# Patient Record
Sex: MALE | Race: WHITE | NOT HISPANIC OR LATINO | Employment: FULL TIME | URBAN - METROPOLITAN AREA
[De-identification: names, ages, dates, MRNs, and addresses within clinical notes are randomized per-mention and may not be internally consistent; named-entity substitution may affect disease eponyms.]

---

## 2017-10-23 ENCOUNTER — OFFICE VISIT (OUTPATIENT)
Dept: MEDICAL GROUP | Facility: MEDICAL CENTER | Age: 32
End: 2017-10-23
Payer: COMMERCIAL

## 2017-10-23 VITALS
RESPIRATION RATE: 16 BRPM | BODY MASS INDEX: 25.98 KG/M2 | WEIGHT: 191.8 LBS | TEMPERATURE: 98.1 F | DIASTOLIC BLOOD PRESSURE: 78 MMHG | HEART RATE: 86 BPM | OXYGEN SATURATION: 97 % | SYSTOLIC BLOOD PRESSURE: 118 MMHG | HEIGHT: 72 IN

## 2017-10-23 DIAGNOSIS — Z00.00 ROUTINE GENERAL MEDICAL EXAMINATION AT A HEALTH CARE FACILITY: ICD-10-CM

## 2017-10-23 DIAGNOSIS — F41.9 ANXIETY: ICD-10-CM

## 2017-10-23 PROCEDURE — 99203 OFFICE O/P NEW LOW 30 MIN: CPT | Performed by: NURSE PRACTITIONER

## 2017-10-23 RX ORDER — BUSPIRONE HYDROCHLORIDE 7.5 MG/1
7.5 TABLET ORAL 2 TIMES DAILY
Qty: 60 TAB | Refills: 5 | Status: SHIPPED | OUTPATIENT
Start: 2017-10-23 | End: 2018-01-17

## 2017-10-23 ASSESSMENT — ENCOUNTER SYMPTOMS: NERVOUS/ANXIOUS: 1

## 2017-10-23 ASSESSMENT — PATIENT HEALTH QUESTIONNAIRE - PHQ9: CLINICAL INTERPRETATION OF PHQ2 SCORE: 0
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
## 2017-11-06 ENCOUNTER — HOSPITAL ENCOUNTER (OUTPATIENT)
Dept: LAB | Facility: MEDICAL CENTER | Age: 32
End: 2017-11-06
Attending: NURSE PRACTITIONER
Payer: COMMERCIAL

## 2017-11-06 DIAGNOSIS — Z00.00 ROUTINE GENERAL MEDICAL EXAMINATION AT A HEALTH CARE FACILITY: ICD-10-CM

## 2017-11-06 LAB
ALBUMIN SERPL BCP-MCNC: 5 G/DL (ref 3.2–4.9)
ALBUMIN/GLOB SERPL: 1.6 G/DL
ALP SERPL-CCNC: 73 U/L (ref 30–99)
ALT SERPL-CCNC: 16 U/L (ref 2–50)
ANION GAP SERPL CALC-SCNC: 6 MMOL/L (ref 0–11.9)
AST SERPL-CCNC: 15 U/L (ref 12–45)
BILIRUB SERPL-MCNC: 0.9 MG/DL (ref 0.1–1.5)
BUN SERPL-MCNC: 11 MG/DL (ref 8–22)
CALCIUM SERPL-MCNC: 10.5 MG/DL (ref 8.5–10.5)
CHLORIDE SERPL-SCNC: 105 MMOL/L (ref 96–112)
CHOLEST SERPL-MCNC: 149 MG/DL (ref 100–199)
CO2 SERPL-SCNC: 29 MMOL/L (ref 20–33)
CREAT SERPL-MCNC: 0.83 MG/DL (ref 0.5–1.4)
GFR SERPL CREATININE-BSD FRML MDRD: >60 ML/MIN/1.73 M 2
GLOBULIN SER CALC-MCNC: 3.1 G/DL (ref 1.9–3.5)
GLUCOSE SERPL-MCNC: 87 MG/DL (ref 65–99)
HDLC SERPL-MCNC: 44 MG/DL
LDLC SERPL CALC-MCNC: 95 MG/DL
POTASSIUM SERPL-SCNC: 4.4 MMOL/L (ref 3.6–5.5)
PROT SERPL-MCNC: 8.1 G/DL (ref 6–8.2)
SODIUM SERPL-SCNC: 140 MMOL/L (ref 135–145)
TRIGL SERPL-MCNC: 49 MG/DL (ref 0–149)
TSH SERPL DL<=0.005 MIU/L-ACNC: 1.49 UIU/ML (ref 0.3–3.7)

## 2017-11-06 PROCEDURE — 84443 ASSAY THYROID STIM HORMONE: CPT

## 2017-11-06 PROCEDURE — 80053 COMPREHEN METABOLIC PANEL: CPT

## 2017-11-06 PROCEDURE — 80061 LIPID PANEL: CPT

## 2017-11-06 PROCEDURE — 36415 COLL VENOUS BLD VENIPUNCTURE: CPT

## 2018-01-17 ENCOUNTER — OFFICE VISIT (OUTPATIENT)
Dept: MEDICAL GROUP | Facility: MEDICAL CENTER | Age: 33
End: 2018-01-17
Payer: COMMERCIAL

## 2018-01-17 VITALS
RESPIRATION RATE: 16 BRPM | HEIGHT: 72 IN | DIASTOLIC BLOOD PRESSURE: 74 MMHG | HEART RATE: 85 BPM | TEMPERATURE: 97.2 F | SYSTOLIC BLOOD PRESSURE: 132 MMHG | BODY MASS INDEX: 26.28 KG/M2 | OXYGEN SATURATION: 98 % | WEIGHT: 194 LBS

## 2018-01-17 DIAGNOSIS — F41.1 GAD (GENERALIZED ANXIETY DISORDER): ICD-10-CM

## 2018-01-17 PROCEDURE — 99213 OFFICE O/P EST LOW 20 MIN: CPT | Performed by: NURSE PRACTITIONER

## 2018-01-17 RX ORDER — BUSPIRONE HYDROCHLORIDE 10 MG/1
10 TABLET ORAL 2 TIMES DAILY
Qty: 60 TAB | Refills: 11 | Status: SHIPPED | OUTPATIENT
Start: 2018-01-17 | End: 2018-05-21

## 2018-01-17 ASSESSMENT — ENCOUNTER SYMPTOMS: NERVOUS/ANXIOUS: 1

## 2018-01-18 NOTE — PROGRESS NOTES
Subjective:      Tereso Rosen is a 32 y.o. male who presents with Medication Management (medication check) and Results (labs)        CC: Patient is here today to discuss treatment for his anxiety.    HPI Tereso Rosen has history of anxiety for which she was previously on Zoloft for about 2 years. He felt it was no longer working well for him and on his last visit requested switching to BuSpar which he has been taking at 7.5 mg twice a day. He does not feel this has been working adequately as well. He did go to counseling for a few sessions but did not feel it helped him. He is not thinking that he has depression but does wonder about PTSD. He states his counselor did not give him any further diagnoses. He is wondering if there is something better for his anxiety but does not want to go back on an SSRI. He denies drug usage. He denies being suicidal.        Social History   Substance Use Topics   • Smoking status: Current Some Day Smoker     Packs/day: 1.00     Types: Cigarettes   • Smokeless tobacco: Never Used      Comment: 3 months ago    • Alcohol use 0.0 oz/week      Comment: occass     Current Outpatient Prescriptions   Medication Sig Dispense Refill   • busPIRone (BUSPAR) 10 MG Tab Take 1 Tab by mouth 2 times a day. 60 Tab 11   • busPIRone (BUSPAR) 7.5 MG tablet Take 1 Tab by mouth 2 times a day. 60 Tab 5     No current facility-administered medications for this visit.      Past Medical History:   Diagnosis Date   • Bronchitis      Family History   Problem Relation Age of Onset   • Diabetes Father    • No Known Problems Mother    • Diabetes Sister        Review of Systems   Psychiatric/Behavioral: The patient is nervous/anxious.    All other systems reviewed and are negative.         Objective:     /74   Pulse 85   Temp 36.2 °C (97.2 °F)   Resp 16   Ht 1.829 m (6')   Wt 88 kg (194 lb)   SpO2 98%   BMI 26.31 kg/m²      Physical Exam   Constitutional: He is oriented to person, place, and  time. He appears well-developed and well-nourished. No distress.   HENT:   Head: Normocephalic and atraumatic.   Right Ear: External ear normal.   Left Ear: External ear normal.   Nose: Nose normal.   Mouth/Throat: Oropharynx is clear and moist.   Eyes: Conjunctivae are normal. Right eye exhibits no discharge. Left eye exhibits no discharge.   Neck: Normal range of motion. Neck supple. No tracheal deviation present. No thyromegaly present.   Cardiovascular: Normal rate, regular rhythm and normal heart sounds.    No murmur heard.  Pulmonary/Chest: Effort normal and breath sounds normal. No respiratory distress. He has no wheezes. He has no rales.   Lymphadenopathy:     He has no cervical adenopathy.   Neurological: He is alert and oriented to person, place, and time. Coordination normal.   Skin: Skin is warm and dry. No rash noted. He is not diaphoretic. No erythema.   Psychiatric: He has a normal mood and affect. His behavior is normal. Judgment and thought content normal.   Nursing note and vitals reviewed.              Assessment/Plan:     1. SHIRA (generalized anxiety disorder)  Patient does not want to add or switch to an SSRI. I will increase his BuSpar to 10 mg twice a day and advised him that I can increase to 15 mg twice a day if necessary. However, my suggestion is for him to see a psychiatrist and talk about other medication options since nothing seems to work especially well for his symptoms. Patient agrees to this.  - busPIRone (BUSPAR) 10 MG Tab; Take 1 Tab by mouth 2 times a day.  Dispense: 60 Tab; Refill: 11  - REFERRAL TO PSYCHIATRY

## 2018-03-19 ENCOUNTER — TELEPHONE (OUTPATIENT)
Dept: MEDICAL GROUP | Facility: MEDICAL CENTER | Age: 33
End: 2018-03-19

## 2018-03-19 NOTE — TELEPHONE ENCOUNTER
1. Caller Name: Tereso Rosen                      Call Back Number:[phone] (home)         2. Message: Pt called wondering if srinivasan had placed his referral to behavioral health, I told him that it was sent to behavioral health as expected and he can call [phone] to schedule an appt.    3. Patient approves office to leave a detailed voicemail/MyChart message: N\A

## 2018-05-21 ENCOUNTER — OFFICE VISIT (OUTPATIENT)
Dept: BEHAVIORAL HEALTH | Facility: PHYSICIAN GROUP | Age: 33
End: 2018-05-21
Payer: COMMERCIAL

## 2018-05-21 DIAGNOSIS — F40.00 AGORAPHOBIA: ICD-10-CM

## 2018-05-21 DIAGNOSIS — F41.1 GAD (GENERALIZED ANXIETY DISORDER): ICD-10-CM

## 2018-05-21 PROCEDURE — 99204 OFFICE O/P NEW MOD 45 MIN: CPT | Performed by: STUDENT IN AN ORGANIZED HEALTH CARE EDUCATION/TRAINING PROGRAM
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
## 2018-06-29 ENCOUNTER — TELEPHONE (OUTPATIENT)
Dept: BEHAVIORAL HEALTH | Facility: PHYSICIAN GROUP | Age: 33
End: 2018-06-29

## 2018-07-03 ENCOUNTER — OFFICE VISIT (OUTPATIENT)
Dept: BEHAVIORAL HEALTH | Facility: PHYSICIAN GROUP | Age: 33
End: 2018-07-03
Payer: COMMERCIAL

## 2018-07-03 VITALS
SYSTOLIC BLOOD PRESSURE: 134 MMHG | DIASTOLIC BLOOD PRESSURE: 92 MMHG | BODY MASS INDEX: 24.64 KG/M2 | HEIGHT: 72 IN | WEIGHT: 181.9 LBS | HEART RATE: 74 BPM

## 2018-07-03 DIAGNOSIS — F40.00 AGORAPHOBIA: ICD-10-CM

## 2018-07-03 PROCEDURE — 99213 OFFICE O/P EST LOW 20 MIN: CPT | Performed by: PSYCHIATRY & NEUROLOGY

## 2018-07-03 RX ORDER — SERTRALINE HYDROCHLORIDE 100 MG/1
100 TABLET, FILM COATED ORAL DAILY
Qty: 30 TAB | Refills: 3 | Status: SHIPPED | OUTPATIENT
Start: 2018-07-03 | End: 2018-11-06 | Stop reason: SDUPTHER
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
## 2018-11-07 RX ORDER — SERTRALINE HYDROCHLORIDE 100 MG/1
100 TABLET, FILM COATED ORAL DAILY
Qty: 30 TAB | Refills: 3 | Status: SHIPPED | OUTPATIENT
Start: 2018-11-07 | End: 2019-02-08 | Stop reason: SDUPTHER

## 2019-02-08 ENCOUNTER — OFFICE VISIT (OUTPATIENT)
Dept: BEHAVIORAL HEALTH | Facility: CLINIC | Age: 34
End: 2019-02-08
Payer: COMMERCIAL

## 2019-02-08 VITALS
WEIGHT: 193 LBS | DIASTOLIC BLOOD PRESSURE: 58 MMHG | BODY MASS INDEX: 26.14 KG/M2 | SYSTOLIC BLOOD PRESSURE: 124 MMHG | HEIGHT: 72 IN | HEART RATE: 72 BPM

## 2019-02-08 DIAGNOSIS — F40.00 AGORAPHOBIA: ICD-10-CM

## 2019-02-08 PROCEDURE — 99213 OFFICE O/P EST LOW 20 MIN: CPT | Mod: GC | Performed by: PSYCHIATRY & NEUROLOGY

## 2019-02-08 RX ORDER — HYDROXYZINE 50 MG/1
TABLET, FILM COATED ORAL
Qty: 90 TAB | Refills: 1 | Status: SHIPPED | OUTPATIENT
Start: 2019-02-08 | End: 2019-02-08 | Stop reason: SDUPTHER

## 2019-02-08 RX ORDER — SERTRALINE HYDROCHLORIDE 100 MG/1
100 TABLET, FILM COATED ORAL DAILY
Qty: 90 TAB | Refills: 1 | Status: SHIPPED | OUTPATIENT
Start: 2019-02-08 | End: 2019-07-03 | Stop reason: SDUPTHER

## 2019-02-08 RX ORDER — HYDROXYZINE 50 MG/1
TABLET, FILM COATED ORAL
Qty: 90 TAB | Refills: 1 | Status: SHIPPED | OUTPATIENT
Start: 2019-02-08 | End: 2020-01-06

## 2019-02-08 ASSESSMENT — ENCOUNTER SYMPTOMS
WEIGHT LOSS: 0
CHILLS: 0
FEVER: 0
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
## 2019-05-10 ENCOUNTER — APPOINTMENT (OUTPATIENT)
Dept: BEHAVIORAL HEALTH | Facility: CLINIC | Age: 34
End: 2019-05-10
Payer: COMMERCIAL
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
## 2019-07-03 ENCOUNTER — OFFICE VISIT (OUTPATIENT)
Dept: BEHAVIORAL HEALTH | Facility: CLINIC | Age: 34
End: 2019-07-03
Payer: COMMERCIAL

## 2019-07-03 VITALS
RESPIRATION RATE: 14 BRPM | DIASTOLIC BLOOD PRESSURE: 94 MMHG | SYSTOLIC BLOOD PRESSURE: 137 MMHG | HEIGHT: 72 IN | BODY MASS INDEX: 27.09 KG/M2 | HEART RATE: 73 BPM | WEIGHT: 200 LBS

## 2019-07-03 DIAGNOSIS — F43.10 PTSD (POST-TRAUMATIC STRESS DISORDER): ICD-10-CM

## 2019-07-03 DIAGNOSIS — F40.00 AGORAPHOBIA: ICD-10-CM

## 2019-07-03 PROCEDURE — 99214 OFFICE O/P EST MOD 30 MIN: CPT | Performed by: NURSE PRACTITIONER

## 2019-07-03 RX ORDER — SERTRALINE HYDROCHLORIDE 100 MG/1
100 TABLET, FILM COATED ORAL DAILY
Qty: 90 TAB | Refills: 0 | Status: SHIPPED | OUTPATIENT
Start: 2019-07-03 | End: 2019-11-02 | Stop reason: SDUPTHER

## 2019-07-03 ASSESSMENT — PATIENT HEALTH QUESTIONNAIRE - PHQ9: CLINICAL INTERPRETATION OF PHQ2 SCORE: 0

## 2019-08-16 ENCOUNTER — OFFICE VISIT (OUTPATIENT)
Dept: BEHAVIORAL HEALTH | Facility: CLINIC | Age: 34
End: 2019-08-16
Payer: COMMERCIAL

## 2019-08-16 DIAGNOSIS — F43.10 PTSD (POST-TRAUMATIC STRESS DISORDER): ICD-10-CM

## 2019-08-16 DIAGNOSIS — F40.00 AGORAPHOBIA: ICD-10-CM

## 2019-08-16 PROCEDURE — 90791 PSYCH DIAGNOSTIC EVALUATION: CPT | Performed by: SOCIAL WORKER
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
## 2019-09-30 ENCOUNTER — APPOINTMENT (OUTPATIENT)
Dept: BEHAVIORAL HEALTH | Facility: CLINIC | Age: 34
End: 2019-09-30
Payer: COMMERCIAL
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
## 2019-10-02 ENCOUNTER — OFFICE VISIT (OUTPATIENT)
Dept: BEHAVIORAL HEALTH | Facility: CLINIC | Age: 34
End: 2019-10-02
Payer: COMMERCIAL

## 2019-10-02 VITALS
BODY MASS INDEX: 27.9 KG/M2 | RESPIRATION RATE: 16 BRPM | HEART RATE: 84 BPM | DIASTOLIC BLOOD PRESSURE: 78 MMHG | SYSTOLIC BLOOD PRESSURE: 133 MMHG | HEIGHT: 72 IN | WEIGHT: 206 LBS

## 2019-10-02 DIAGNOSIS — F43.10 PTSD (POST-TRAUMATIC STRESS DISORDER): ICD-10-CM

## 2019-10-02 PROCEDURE — 99214 OFFICE O/P EST MOD 30 MIN: CPT | Performed by: NURSE PRACTITIONER

## 2019-10-02 PROCEDURE — 90833 PSYTX W PT W E/M 30 MIN: CPT | Performed by: NURSE PRACTITIONER

## 2019-10-04 ENCOUNTER — OFFICE VISIT (OUTPATIENT)
Dept: BEHAVIORAL HEALTH | Facility: CLINIC | Age: 34
End: 2019-10-04
Payer: COMMERCIAL

## 2019-10-04 DIAGNOSIS — F43.10 PTSD (POST-TRAUMATIC STRESS DISORDER): ICD-10-CM

## 2019-10-04 DIAGNOSIS — F40.00 AGORAPHOBIA: ICD-10-CM

## 2019-10-04 PROCEDURE — 90834 PSYTX W PT 45 MINUTES: CPT | Performed by: SOCIAL WORKER
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
## 2019-11-08 ENCOUNTER — OFFICE VISIT (OUTPATIENT)
Dept: BEHAVIORAL HEALTH | Facility: CLINIC | Age: 34
End: 2019-11-08
Payer: COMMERCIAL

## 2019-11-08 DIAGNOSIS — F43.10 PTSD (POST-TRAUMATIC STRESS DISORDER): ICD-10-CM

## 2019-11-08 DIAGNOSIS — F41.1 GAD (GENERALIZED ANXIETY DISORDER): ICD-10-CM

## 2019-11-08 PROCEDURE — 90834 PSYTX W PT 45 MINUTES: CPT | Performed by: SOCIAL WORKER

## 2019-12-04 NOTE — BH THERAPY
[facility]  Therapy Progress Note    Patient Name: Tereso Rosen  Patient MRN: [id number]  Today's Date: 12/3/2019     Type of session:Individual psychotherapy  Length of session: 45 minutes  Persons in attendance:Patient    Subjective/New Info: Reviewed CBT cognitive restructuring technique to use prior to driving and he was given thought record to process the experience post driving experience.  We will review worksheets next session.  Discussed range of emotions related to life experience to include difficult and or uncomfortable feelings.  Reviewed coping strategies including distress tolerance techniques mindfulness, meditation, and breathing.    Discussed home environment with Jabari reporting step son and his girlfriend are planning to get own apartment which patient is encouraging.     Patient did not present in acute distress. Patient was appropriately groomed and cooperative. Patient was alert and oriented to person, place, and time. Eye contact was appropriate. No abnormalities in attention or concentration were noted. No abnormalities of movement present; psychomotor activity was normal. Speech was fluent and regular in rhythm, rate, volume, and tone. Thought processes were linear, logical, and goal-directed. Affect was appropriate and congruent with thought content and conversation.     Objective/Observations:   Participation: Active verbal participation, Attentive, Engaged and Open to feedback   Grooming: Casual and Neat   Cognition: Alert and Fully Oriented   Eye contact: Good   Mood: Neutral   Affect: Flexible and Constricted   Thought process: Logical and Goal-directed   Speech: Rate within normal limits and Volume within normal limits   Other:     Diagnoses:   1. PTSD (post-traumatic stress disorder)    2. SHIRA (generalized anxiety disorder)         Current risk:   SUICIDE: Not applicable   Homicide: Not applicable   Self-harm: Not applicable   Relapse: Low   Other:    Safety Plan  reviewed? Not Indicated   If evidence of imminent risk is present, intervention/plan:     Therapeutic Intervention(s): Cognitive modification, Distress tolerance skills, Parenting/familial roles addressed and Self-care skills    Treatment Goal(s)/Objective(s) addressed: Learn and implement coping skills that result in a reduction of anxiety and worry, and improve daily functioning.     Progress toward Treatment Goals: Moderate improvement    Plan:  - Next appointment scheduled:  12/27/2019  - Patient is in agreement with the above plan:  YES    Corinne G. Taylor, L.C.[place]  12/3/2019
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
## 2020-01-06 ENCOUNTER — OFFICE VISIT (OUTPATIENT)
Dept: BEHAVIORAL HEALTH | Facility: CLINIC | Age: 35
End: 2020-01-06
Payer: COMMERCIAL

## 2020-01-06 VITALS
HEIGHT: 72 IN | WEIGHT: 211 LBS | DIASTOLIC BLOOD PRESSURE: 83 MMHG | SYSTOLIC BLOOD PRESSURE: 131 MMHG | BODY MASS INDEX: 28.58 KG/M2 | RESPIRATION RATE: 14 BRPM | HEART RATE: 79 BPM

## 2020-01-06 DIAGNOSIS — F43.10 PTSD (POST-TRAUMATIC STRESS DISORDER): ICD-10-CM

## 2020-01-06 PROCEDURE — 99213 OFFICE O/P EST LOW 20 MIN: CPT | Performed by: NURSE PRACTITIONER

## 2020-01-06 RX ORDER — SERTRALINE HYDROCHLORIDE 100 MG/1
150 TABLET, FILM COATED ORAL DAILY
Qty: 135 TAB | Refills: 0 | Status: SHIPPED | OUTPATIENT
Start: 2020-01-06 | End: 2020-04-15
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
## 2020-03-09 ENCOUNTER — APPOINTMENT (OUTPATIENT)
Dept: BEHAVIORAL HEALTH | Facility: CLINIC | Age: 35
End: 2020-03-09
Payer: COMMERCIAL

## 2020-04-15 RX ORDER — SERTRALINE HYDROCHLORIDE 100 MG/1
TABLET, FILM COATED ORAL
Qty: 135 TAB | Refills: 0 | Status: SHIPPED | OUTPATIENT
Start: 2020-04-15 | End: 2020-04-30

## 2020-04-30 ENCOUNTER — TELEMEDICINE (OUTPATIENT)
Dept: BEHAVIORAL HEALTH | Facility: CLINIC | Age: 35
End: 2020-04-30
Payer: COMMERCIAL

## 2020-04-30 VITALS — HEIGHT: 72 IN | BODY MASS INDEX: 27.09 KG/M2 | WEIGHT: 200 LBS

## 2020-04-30 DIAGNOSIS — F43.10 POSTTRAUMATIC STRESS DISORDER: Primary | ICD-10-CM

## 2020-04-30 DIAGNOSIS — F40.00 AGORAPHOBIA: ICD-10-CM

## 2020-04-30 PROCEDURE — 99214 OFFICE O/P EST MOD 30 MIN: CPT | Mod: 95,CR | Performed by: PSYCHIATRY & NEUROLOGY

## 2020-04-30 RX ORDER — SERTRALINE HYDROCHLORIDE 100 MG/1
200 TABLET, FILM COATED ORAL DAILY
Qty: 60 TAB | Refills: 2 | Status: SHIPPED | OUTPATIENT
Start: 2020-04-30 | End: 2020-06-03

## 2020-04-30 ASSESSMENT — PATIENT HEALTH QUESTIONNAIRE - PHQ9: CLINICAL INTERPRETATION OF PHQ2 SCORE: 0
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
## 2020-06-03 DIAGNOSIS — F43.10 POSTTRAUMATIC STRESS DISORDER: ICD-10-CM

## 2020-06-03 RX ORDER — SERTRALINE HYDROCHLORIDE 100 MG/1
TABLET, FILM COATED ORAL
Qty: 60 TAB | Refills: 2 | Status: SHIPPED | OUTPATIENT
Start: 2020-06-03 | End: 2020-08-06 | Stop reason: SDUPTHER

## 2020-08-06 ENCOUNTER — TELEMEDICINE (OUTPATIENT)
Dept: BEHAVIORAL HEALTH | Facility: CLINIC | Age: 35
End: 2020-08-06
Payer: COMMERCIAL

## 2020-08-06 VITALS — HEIGHT: 72 IN | WEIGHT: 200 LBS | BODY MASS INDEX: 27.09 KG/M2

## 2020-08-06 DIAGNOSIS — F43.10 POSTTRAUMATIC STRESS DISORDER: ICD-10-CM

## 2020-08-06 DIAGNOSIS — F40.00 AGORAPHOBIA: ICD-10-CM

## 2020-08-06 PROCEDURE — 99213 OFFICE O/P EST LOW 20 MIN: CPT | Mod: 95,CR | Performed by: PSYCHIATRY & NEUROLOGY

## 2020-08-06 PROCEDURE — 90833 PSYTX W PT W E/M 30 MIN: CPT | Mod: 95,CR | Performed by: PSYCHIATRY & NEUROLOGY

## 2020-08-06 RX ORDER — PROPRANOLOL HYDROCHLORIDE 10 MG/1
10 TABLET ORAL 2 TIMES DAILY PRN
Qty: 60 TAB | Refills: 2 | Status: SHIPPED | OUTPATIENT
Start: 2020-08-06 | End: 2020-09-02

## 2020-08-06 RX ORDER — SERTRALINE HYDROCHLORIDE 100 MG/1
TABLET, FILM COATED ORAL
Qty: 60 TAB | Refills: 2 | Status: SHIPPED | OUTPATIENT
Start: 2020-08-06 | End: 2020-11-05 | Stop reason: SDUPTHER
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
## 2020-09-02 DIAGNOSIS — F43.10 POSTTRAUMATIC STRESS DISORDER: ICD-10-CM

## 2020-09-02 DIAGNOSIS — F40.00 AGORAPHOBIA: ICD-10-CM

## 2020-09-02 RX ORDER — PROPRANOLOL HYDROCHLORIDE 10 MG/1
10 TABLET ORAL 2 TIMES DAILY PRN
Qty: 60 TAB | Refills: 2 | Status: SHIPPED | OUTPATIENT
Start: 2020-09-02 | End: 2020-11-05 | Stop reason: SDUPTHER

## 2020-11-05 ENCOUNTER — TELEMEDICINE (OUTPATIENT)
Dept: BEHAVIORAL HEALTH | Facility: CLINIC | Age: 35
End: 2020-11-05
Payer: COMMERCIAL

## 2020-11-05 VITALS — HEIGHT: 72 IN | WEIGHT: 195 LBS | BODY MASS INDEX: 26.41 KG/M2

## 2020-11-05 DIAGNOSIS — F40.00 AGORAPHOBIA: ICD-10-CM

## 2020-11-05 DIAGNOSIS — F43.10 POSTTRAUMATIC STRESS DISORDER: ICD-10-CM

## 2020-11-05 PROCEDURE — 99213 OFFICE O/P EST LOW 20 MIN: CPT | Mod: 95,CR | Performed by: PSYCHIATRY & NEUROLOGY

## 2020-11-05 RX ORDER — PROPRANOLOL HYDROCHLORIDE 10 MG/1
10 TABLET ORAL 2 TIMES DAILY PRN
Qty: 30 TAB | Refills: 0 | Status: SHIPPED | OUTPATIENT
Start: 2020-11-05 | End: 2020-12-07

## 2020-11-05 RX ORDER — SERTRALINE HYDROCHLORIDE 100 MG/1
TABLET, FILM COATED ORAL
Qty: 60 TAB | Refills: 2 | Status: SHIPPED | OUTPATIENT
Start: 2020-11-05 | End: 2021-03-02 | Stop reason: SDUPTHER
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
## 2021-03-02 ENCOUNTER — TELEMEDICINE (OUTPATIENT)
Dept: BEHAVIORAL HEALTH | Facility: CLINIC | Age: 36
End: 2021-03-02
Payer: COMMERCIAL

## 2021-03-02 DIAGNOSIS — F43.10 POSTTRAUMATIC STRESS DISORDER: ICD-10-CM

## 2021-03-02 DIAGNOSIS — F40.00 AGORAPHOBIA: ICD-10-CM

## 2021-03-02 PROCEDURE — 96127 BRIEF EMOTIONAL/BEHAV ASSMT: CPT | Mod: 95,CR | Performed by: PSYCHIATRY & NEUROLOGY

## 2021-03-02 PROCEDURE — 99213 OFFICE O/P EST LOW 20 MIN: CPT | Mod: 95,CR | Performed by: PSYCHIATRY & NEUROLOGY

## 2021-03-02 RX ORDER — PROPRANOLOL HYDROCHLORIDE 10 MG/1
10 TABLET ORAL 2 TIMES DAILY PRN
Qty: 30 TABLET | Refills: 5 | Status: ON HOLD | OUTPATIENT
Start: 2021-03-02 | End: 2021-10-15

## 2021-03-02 RX ORDER — SERTRALINE HYDROCHLORIDE 100 MG/1
TABLET, FILM COATED ORAL
Qty: 180 TABLET | Refills: 1 | Status: SHIPPED | OUTPATIENT
Start: 2021-03-02 | End: 2021-03-26

## 2021-03-02 ASSESSMENT — PATIENT HEALTH QUESTIONNAIRE - PHQ9: CLINICAL INTERPRETATION OF PHQ2 SCORE: 0

## 2021-03-02 ASSESSMENT — ANXIETY QUESTIONNAIRES
GAD7 TOTAL SCORE: 3
5. BEING SO RESTLESS THAT IT IS HARD TO SIT STILL: SEVERAL DAYS
6. BECOMING EASILY ANNOYED OR IRRITABLE: NOT AT ALL
1. FEELING NERVOUS, ANXIOUS, OR ON EDGE: SEVERAL DAYS
3. WORRYING TOO MUCH ABOUT DIFFERENT THINGS: SEVERAL DAYS
7. FEELING AFRAID AS IF SOMETHING AWFUL MIGHT HAPPEN: NOT AT ALL
2. NOT BEING ABLE TO STOP OR CONTROL WORRYING: NOT AT ALL
4. TROUBLE RELAXING: NOT AT ALL

## 2021-03-02 NOTE — PROGRESS NOTES
This evaluation was conducted via Zoom using secure and encrypted videoconferencing technology. The patient was in a private location in the state [facility].    The patient's identity was confirmed and verbal consent was obtained for this virtual visit.     PSYCHIATRY FOLLOW-UP NOTE      Name: Tereso Rosen  MRN: [id number]  : 1985  Age: 35 y.o.  Date of assessment: 3/2/2021  PCP: FERCHO Paul  Persons in attendance: Patient  Total face-to-face time: 15 minutes  Time for documentation and reviewing past records:  10 minutes    REASON FOR VISIT/CHIEF COMPLAINT (as stated by Patient):  Tereso Rosen is a 35 y.o., White male, attending follow-up appointment for mood and anxiety management.      HISTORY OF PRESENT ILLNESS:  Tereso Rosen is a 35 y.o. old male with PTSD and SHIRA comes in today for follow up. Patient was last seen 3 months ago, and following treatment planning recommendations were done:  · Increase Sertraline to 200 mg daily for PTSD management. given 1 month with 2 refill supply.   · Add Inderal 10 mg BID PRN for anxiety related to driving: agreed with testing first dose of signs of hypotension & initially driving with a friend. Given 30 tabs with 0 refills.  · Patient is not interested in psychotherapy at this time.      Patient is compliant with medication with no side effect.  Patient describes current dose of Zoloft as helpful with anxiety symptom and is denying any symptoms of depression.  Patient scored 0 on PHQ 2 and scored 3 on SHIRA 7 indicating appropriate level of anxiety.  Patient reports using Inderal only as needed and not using this on a daily basis but reports Inderal as helpful in reducing anxiety symptoms and not feeling dizzy or having any signs of low blood pressure.  Patient prefers to stay on the current medication and do not feel like he needs psychotherapy.  Patient describes everyone doing well at home and describes his work as positive at this  time.      CURRENT MEDICATIONS:  Current Outpatient Medications   Medication Sig Dispense Refill   • propranolol (INDERAL) 10 MG Tab Take 1 Tab by mouth 2 times a day as needed (anxiety). 30 Tab 0   • sertraline (ZOLOFT) 100 MG Tab TAKE 2 TABLETS BY MOUTH EVERY DAY 60 Tab 2     No current facility-administered medications for this visit.       MEDICAL HISTORY  Past Medical History:   Diagnosis Date   • Anxiety    • Bronchitis      No past surgical history on file.    PAST PSYCHIATRIC HISTORY  Prior psychiatric hospitalization: none  Prior Self harm/suicide attempt: no  Prior Diagnosis: PTSD     PAST PSYCHIATRIC MEDICATIONS  Sertraline- did not work at lower dose, started working at 100 mg daily  Buspirone 10mg PO BID - not helpful  Hydroxyzine 25-50mg TID PRN - has not needed to take it in a few months     FAMILY HISTORY  Psychiatric diagnosis:  none  History of suicide attempts:  no  Substance abuse history:  no     SUBSTANCE USE HISTORY:  ALCOHOL: no alcohol for > 2 1/2 years  TOBACCO: no  CANNABIS: no  OPIOIDS: no  PRESCRIPTION MEDICATIONS: no  OTHERS: no  History of inpatient/outpatient rehab treatment: none     SOCIAL HISTORY  Childhood: born in [place] and describes childhood as ok  Education: 2 yr college  in Special Education: no  Intellectual Disability: no  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    REVIEW OF SYSTEMS:        Constitutional  negative   Eyes negative   Ears/Nose/Mouth/Throat negative   Cardiovascular negative   Respiratory negative   Gastrointestinal negative   Genitourinary negative   Muscular negative   Integumentary negative   Neurological negative   Endocrine negative   Hematologic/Lymphatic negative     PHYSICAL EXAMINAION:  Vital signs: There were no vitals taken for this visit.  Musculoskeletal: Normal gait.   Abnormal movements: none      MENTAL STATUS EXAMINATION      General:   - Grooming and hygiene: Casual,   - Apparent distress: none,   - Behavior: Calm  - Eye Contact:  Good,   - no psychomotor agitation or retardation    - Participation: Active verbal participation  Orientation: Alert and Fully Oriented to person, place and time  Mood: Euthymic  Affect: Flexible and Full range,  Thought Process: Logical and Goal-directed  Thought Content: Denies suicidal or homicidal ideations, intent or plan Within normal limits  Perception: Denies auditory or visual hallucinations. No delusions noted Within normal limits  Attention span and concentration: Intact   Speech:Rate within normal limits and Volume within normal limits  Language: Appropriate   Insight: Good  Judgment: Good  Recent and remote memory: No gross evidence of memory deficits        DEPRESSION SCREENING:  Depression Screen (PHQ-2/PHQ-9) 10/23/2017 7/3/2019 4/30/2020   PHQ-2 Total Score 0 0 0       Interpretation of PHQ-9 Total Score   Score Severity   1-4 No Depression   5-9 Mild Depression   10-14 Moderate Depression   15-19 Moderately Severe Depression   20-27 Severe Depression    CURRENT RISK:       Suicidal: Low       Homicidal: Low       Self-Harm: Low       Relapse: Low       Crisis Safety Plan Reviewed Not Indicated       If evidence of imminent risk is present, intervention/plan:      MEDICAL RECORDS/LABS/DIAGNOSTIC TESTS REVIEWED:  No new lab since last visit     [place]  records -   Reviewed     DIAGNOSTIC  IMPRESSION(S):  1. PTSD  2. Agoraphobia     PLAN:  (1) PTSD  · Improving  · Increase Sertraline to 200 mg daily for PTSD management.  given 3 month with 1 refill supply.   · Patient is not interested in psychotherapy at this time.  · Medication options, alternatives (including no medications) and medication risks/benefits/side effects were discussed in detail.  · Explained importance of contraceptive measures while on psychotropic medications, educated to let provider know if ever pregnant or wanting to become pregnant. Verbalized understanding.  · The patient was advised to call, message provider on [employer], or come in to the clinic if symptoms worsen or if any future questions/issues regarding their medications arise; the patient verbalized understanding and agreement.    · The patient was educated to call 911, call the suicide hotline, or go to local ER if having thoughts of suicide or homicide; verbalized understanding.     (2) Agoraphobia  · Improving  · Increase Sertraline to 200 mg daily for PTSD management. given 3 month with 1 refill supply.   · Continue Inderal 10 mg BID PRN for anxiety related to driving: agreed with testing first dose of signs of hypotension & initially driving with a friend. Given 30 tabs with 5 refills.  · Patient is not interested in psychotherapy at this time.       Return to clinic in 6 months or sooner if symptoms worsen.  Next Appointment: instruction provided on how to make the next appointment.     The proposed treatment plan was discussed with the patient who was provided the opportunity to ask questions and make suggestions regarding alternative treatment. Patient verbalized understanding and expressed agreement with the plan.       Skyler Ugarte M.D.  03/02/21    This note was created using voice recognition software (Dragon). The accuracy of the dictation is limited by the abilities of the software. I have reviewed the note prior to signing, however some errors in grammar  and context are still possible. If you have any questions related to this note please do not hesitate to contact our office.

## 2021-03-25 DIAGNOSIS — F43.10 POSTTRAUMATIC STRESS DISORDER: ICD-10-CM

## 2021-03-26 RX ORDER — SERTRALINE HYDROCHLORIDE 100 MG/1
TABLET, FILM COATED ORAL
Qty: 180 TABLET | Refills: 1 | Status: SHIPPED | OUTPATIENT
Start: 2021-03-26 | End: 2021-10-07 | Stop reason: SDUPTHER

## 2021-04-30 ENCOUNTER — OFFICE VISIT (OUTPATIENT)
Dept: MEDICAL GROUP | Facility: MEDICAL CENTER | Age: 36
End: 2021-04-30
Payer: COMMERCIAL

## 2021-04-30 VITALS
HEIGHT: 72 IN | WEIGHT: 245 LBS | HEART RATE: 84 BPM | SYSTOLIC BLOOD PRESSURE: 130 MMHG | DIASTOLIC BLOOD PRESSURE: 82 MMHG | TEMPERATURE: 97.3 F | OXYGEN SATURATION: 97 % | BODY MASS INDEX: 33.18 KG/M2

## 2021-04-30 DIAGNOSIS — L05.91 PILONIDAL CYST WITHOUT ABSCESS: ICD-10-CM

## 2021-04-30 DIAGNOSIS — F40.00 AGORAPHOBIA: ICD-10-CM

## 2021-04-30 DIAGNOSIS — E66.9 OBESITY (BMI 30-39.9): ICD-10-CM

## 2021-04-30 DIAGNOSIS — Z83.3 FAMILY HISTORY OF DIABETES MELLITUS IN FATHER: ICD-10-CM

## 2021-04-30 DIAGNOSIS — D48.9 NEOPLASM OF UNCERTAIN BEHAVIOR: ICD-10-CM

## 2021-04-30 DIAGNOSIS — Z00.00 ANNUAL PHYSICAL EXAM: ICD-10-CM

## 2021-04-30 PROBLEM — M53.3 COCCYGODYNIA: Status: ACTIVE | Noted: 2021-04-30

## 2021-04-30 PROCEDURE — 99204 OFFICE O/P NEW MOD 45 MIN: CPT | Performed by: NURSE PRACTITIONER

## 2021-04-30 NOTE — ASSESSMENT & PLAN NOTE
Has large mole on left temple for many years. Used to be flush to the skin, now has grown in size. Mother has a history of skin cancer so he would like to get checked for this.

## 2021-04-30 NOTE — PROGRESS NOTES
Tereso Rosen is a 35 y.o. male here to establish care and discuss the following, prior patient of Colby Jennifer:    HPI:    Pilonidal cyst without abscess  Injured his tailbone in high school, fell on tailbone, never had evaluation for this. Pain resolved then would return about once per year and go away.     In the last 6 months it has been more chronic, had an episode of it becoming infected and draining in around February, this was not treated and improved on it's own but feels like something is still there, very painful, will occasionally drain, feels like something is poking through the skin.     Denies fevers, chills, body aches, nausea, malaise.     Neoplasm of uncertain behavior  Has large mole on left temple for many years. Used to be flush to the skin, now has grown in size. Mother has a history of skin cancer so he would like to get checked for this.     Agoraphobia  Chronic.  Established with psychiatry, [facility].  Stable on sertraline 100 mg daily and propranolol 10 mg twice daily as needed.    Current medicines (including changes today)  Current Outpatient Medications   Medication Sig Dispense Refill   • sertraline (ZOLOFT) 100 MG Tab TAKE 2 TABLETS BY MOUTH EVERY  tablet 1   • propranolol (INDERAL) 10 MG Tab Take 1 tablet by mouth 2 times a day as needed (anxiety). 30 tablet 5     No current facility-administered medications for this visit.     He  has a past medical history of Anxiety and Bronchitis. He also has no past medical history of Allergy, ASTHMA, or Diabetes.  He  has no past surgical history on file.  Social History     Tobacco Use   • Smoking status: Current Some Day Smoker   • Smokeless tobacco: Never Used   • Tobacco comment: 2-3 days per week, 1   Substance Use Topics   • Alcohol use: No     Alcohol/week: 0.0 oz     Comment: stopped in April, 2018   • Drug use: No     Social History     Social History Narrative   • Not on file     Family History   Problem  Relation Age of Onset   • Diabetes Father    • No Known Problems Mother    • Diabetes Sister      Family Status   Relation Name Status   • Fa  Alive   • Mo  Alive   • Sis  Alive         ROS  No chest pain, no abdominal pain, no rash.  Positive ROS as per HPI.  All other systems reviewed and are negative      Objective:     /82 (BP Location: Right arm, Patient Position: Sitting, BP Cuff Size: Adult)   Pulse 84   Temp 36.3 °C (97.3 °F) (Temporal)   Ht 1.829 m (6')   Wt 111 kg (245 lb)   SpO2 97%  Body mass index is 33.23 kg/m².  Physical Exam:    Constitutional: Alert, no distress.  Skin: Warm, dry, good turgor, no rashes in visible areas.  Eye: Equal, round and reactive, conjunctiva clear, lids normal.  ENMT: Lips without lesions, good dentition, oropharynx clear.  Neck: Trachea midline, no masses, no thyromegaly. No cervical or supraclavicular lymphadenopathy.  Respiratory: Unlabored respiratory effort, lungs clear to auscultation, no wheezes, no ronchi.  Cardiovascular: Normal S1, S2, no murmur, no edema.  Abdomen: Soft, non-tender, no masses, no hepatosplenomegaly.  Psych: Alert and oriented x3, normal affect and mood.      Assessment and Plan:   The following treatment plan was discussed    1. Pilonidal cyst without abscess  Unstable  No current infection or abscess  Slight drainage noted  May need marsupialization  Follow-up with dermatology for this  - REFERRAL TO DERMATOLOGY    2. Neoplasm of uncertain behavior  Patient has large mole on left temple that he would like evaluated  Follow-up with dermatology  - REFERRAL TO DERMATOLOGY    3. Agoraphobia  Stable  Continue medications and follow-up per psychiatry    4. Annual physical exam  - CBC WITH DIFFERENTIAL; Future  - Comp Metabolic Panel; Future  - HEMOGLOBIN A1C; Future  - Lipid Profile; Future  - TSH WITH REFLEX TO FT4; Future  - VITAMIN D,25 HYDROXY; Future    5. Family history of diabetes mellitus in father  - HEMOGLOBIN A1C; Future    6.  Obesity (BMI 30-39.9)  - Patient identified as having weight management issue.  Appropriate orders and counseling given.      Records reviewed  Followup: Return in about 3 months (around 7/30/2021) for Annual, Lab Review.    I have placed the below orders and discussed them with an approved delegating provider. The MA is performing the below orders under the direction of Dr. Bunch

## 2021-04-30 NOTE — PATIENT INSTRUCTIONS
Pilonidal Cyst    A pilonidal cyst is a fluid-filled sac that forms beneath the skin near the tailbone, at the top of the crease of the buttocks (pilonidal area). If the cyst is not large and not infected, it may not cause any problems.  If the cyst becomes irritated or infected, it may get larger and fill with pus. An infected cyst is called an abscess. A pilonidal abscess may cause pain and swelling, and it may need to be drained or removed.  What are the causes?  The cause of this condition is not always known. In some cases, a hair that grows into your skin (ingrown hair) may be the cause.  What increases the risk?  You are more likely to get a pilonidal cyst if you:  · Are male.  · Have lots of hair near the crease of the buttocks.  · Are overweight.  · Have a dimple near the crease of the buttocks.  · Wear tight clothing.  · Do not bathe or shower often.  · Sit for long periods of time.  What are the signs or symptoms?  Signs and symptoms of a pilonidal cyst may include pain, swelling, redness, and warmth in the pilonidal area. Depending on how big the cyst is, you may be able to feel a lump near your tailbone. If your cyst becomes infected, symptoms may include:  · Pus or fluid drainage.  · Fever.  · Pain, swelling, and redness getting worse.  · The lump getting bigger.  How is this diagnosed?  This condition may be diagnosed based on:  · Your symptoms and medical history.  · A physical exam.  · A blood test to check for infection.  · Testing a pus sample, if applicable.  How is this treated?  If your cyst does not cause symptoms, you may not need any treatment. If your cyst bothers you or is infected, you may need a procedure to drain or remove the cyst. Depending on the size, location, and severity of your cyst, your health care provider may:  · Make an incision in the cyst and drain it (incision and drainage).  · Open and drain the cyst, and then stitch the wound so that it stays open while it heals  (marsupialization). You will be given instructions about how to care for your open wound while it heals.  · Remove all or part of the cyst, and then close the wound (cyst removal).  You may need to take antibiotic medicines before your procedure.  Follow these instructions at home:  Medicines  · Take over-the-counter and prescription medicines only as told by your health care provider.  · If you were prescribed an antibiotic medicine, take it as told by your health care provider. Do not stop taking the antibiotic even if you start to feel better.  General instructions  · Keep the area around your pilonidal cyst clean and dry.  · If there is fluid or pus draining from your cyst:  ? Cover the area with a clean bandage (dressing) as needed.  ? Wash the area gently with soap and water. Pat the area dry with a clean towel. Do not rub the area because that may cause bleeding.  · Remove hair from the area around the cyst only if your health care provider tells you to do this.  · Do not wear tight pants or sit in one position for long periods at a time.  · Keep all follow-up visits as told by your health care provider. This is important.  Contact a health care provider if you have:  · New redness, swelling, or pain.  · A fever.  · Severe pain.  Summary  · A pilonidal cyst is a fluid-filled sac that forms beneath the skin near the tailbone, at the top of the crease of the buttocks (pilonidal area).  · If the cyst becomes irritated or infected, it may get larger and fill with pus. An infected cyst is called an abscess.  · The cause of this condition is not always known. In some cases, a hair that grows into your skin (ingrown hair) may be the cause.  · If your cyst does not cause symptoms, you may not need any treatment. If your cyst bothers you or is infected, you may need a procedure to drain or remove the cyst.  This information is not intended to replace advice given to you by your health care provider. Make sure you  discuss any questions you have with your health care provider.  Document Released: 12/15/2001 Document Revised: 12/06/2018 Document Reviewed: 12/06/2018  Elsevier Patient Education © 2020 [employer] Inc[employer]

## 2021-04-30 NOTE — ASSESSMENT & PLAN NOTE
Injured his tailbone in high school, fell on elbae, never had evaluation for this. Pain resolved then would return about once per year and go away.     In the last 6 months it has been more chronic, had an episode of it becoming infected and draining in around February, this was not treated and improved on it's own but feels like something is still there, very painful, will occasionally drain, feels like something is poking through the skin.     Denies fevers, chills, body aches, nausea, malaise.

## 2021-04-30 NOTE — ASSESSMENT & PLAN NOTE
Chronic.  Established with psychiatry, [facility].  Stable on sertraline 100 mg daily and propranolol 10 mg twice daily as needed.

## 2021-07-01 LAB
25(OH)D3+25(OH)D2 SERPL-MCNC: 21.9 NG/ML (ref 30–100)
ALBUMIN SERPL-MCNC: 4.7 G/DL (ref 4–5)
ALBUMIN/GLOB SERPL: 2.1 {RATIO} (ref 1.2–2.2)
ALP SERPL-CCNC: 84 IU/L (ref 48–121)
ALT SERPL-CCNC: 14 IU/L (ref 0–44)
AST SERPL-CCNC: 16 IU/L (ref 0–40)
BASOPHILS # BLD AUTO: 0.1 X10E3/UL (ref 0–0.2)
BASOPHILS NFR BLD AUTO: 1 %
BILIRUB SERPL-MCNC: 0.3 MG/DL (ref 0–1.2)
BUN SERPL-MCNC: 14 MG/DL (ref 6–20)
BUN/CREAT SERPL: 18 (ref 9–20)
CALCIUM SERPL-MCNC: 9.4 MG/DL (ref 8.7–10.2)
CHLORIDE SERPL-SCNC: 105 MMOL/L (ref 96–106)
CHOLEST SERPL-MCNC: 160 MG/DL (ref 100–199)
CO2 SERPL-SCNC: 23 MMOL/L (ref 20–29)
CREAT SERPL-MCNC: 0.8 MG/DL (ref 0.76–1.27)
EOSINOPHIL # BLD AUTO: 0.1 X10E3/UL (ref 0–0.4)
EOSINOPHIL NFR BLD AUTO: 1 %
ERYTHROCYTE [DISTWIDTH] IN BLOOD BY AUTOMATED COUNT: 12.6 % (ref 11.6–15.4)
GLOBULIN SER CALC-MCNC: 2.2 G/DL (ref 1.5–4.5)
GLUCOSE SERPL-MCNC: 113 MG/DL (ref 65–99)
HBA1C MFR BLD: 5.4 % (ref 4.8–5.6)
HCT VFR BLD AUTO: 46.7 % (ref 37.5–51)
HDLC SERPL-MCNC: 35 MG/DL
HGB BLD-MCNC: 15.2 G/DL (ref 13–17.7)
IMM GRANULOCYTES # BLD AUTO: 0 X10E3/UL (ref 0–0.1)
IMM GRANULOCYTES NFR BLD AUTO: 0 %
IMMATURE CELLS  115398: NORMAL
LABORATORY COMMENT REPORT: ABNORMAL
LDLC SERPL CALC-MCNC: 112 MG/DL (ref 0–99)
LYMPHOCYTES # BLD AUTO: 1.9 X10E3/UL (ref 0.7–3.1)
LYMPHOCYTES NFR BLD AUTO: 27 %
MCH RBC QN AUTO: 29.3 PG (ref 26.6–33)
MCHC RBC AUTO-ENTMCNC: 32.5 G/DL (ref 31.5–35.7)
MCV RBC AUTO: 90 FL (ref 79–97)
MONOCYTES # BLD AUTO: 0.4 X10E3/UL (ref 0.1–0.9)
MONOCYTES NFR BLD AUTO: 6 %
MORPHOLOGY BLD-IMP: NORMAL
NEUTROPHILS # BLD AUTO: 4.5 X10E3/UL (ref 1.4–7)
NEUTROPHILS NFR BLD AUTO: 65 %
NRBC BLD AUTO-RTO: NORMAL %
PLATELET # BLD AUTO: 187 X10E3/UL (ref 150–450)
POTASSIUM SERPL-SCNC: 4.9 MMOL/L (ref 3.5–5.2)
PROT SERPL-MCNC: 6.9 G/DL (ref 6–8.5)
RBC # BLD AUTO: 5.18 X10E6/UL (ref 4.14–5.8)
SODIUM SERPL-SCNC: 141 MMOL/L (ref 134–144)
TRIGL SERPL-MCNC: 63 MG/DL (ref 0–149)
TSH SERPL DL<=0.005 MIU/L-ACNC: 1.03 UIU/ML (ref 0.45–4.5)
VLDLC SERPL CALC-MCNC: 13 MG/DL (ref 5–40)
WBC # BLD AUTO: 7 X10E3/UL (ref 3.4–10.8)

## 2021-07-09 ENCOUNTER — OFFICE VISIT (OUTPATIENT)
Dept: MEDICAL GROUP | Facility: MEDICAL CENTER | Age: 36
End: 2021-07-09
Payer: COMMERCIAL

## 2021-07-09 VITALS
HEART RATE: 83 BPM | SYSTOLIC BLOOD PRESSURE: 128 MMHG | WEIGHT: 243 LBS | OXYGEN SATURATION: 99 % | DIASTOLIC BLOOD PRESSURE: 78 MMHG | TEMPERATURE: 97.6 F | BODY MASS INDEX: 32.91 KG/M2 | HEIGHT: 72 IN

## 2021-07-09 DIAGNOSIS — Z23 NEED FOR VACCINATION: ICD-10-CM

## 2021-07-09 DIAGNOSIS — Z00.00 ANNUAL PHYSICAL EXAM: ICD-10-CM

## 2021-07-09 DIAGNOSIS — R73.01 ELEVATED FASTING GLUCOSE: ICD-10-CM

## 2021-07-09 DIAGNOSIS — Z83.3 FAMILY HISTORY OF DIABETES MELLITUS: ICD-10-CM

## 2021-07-09 PROCEDURE — 90471 IMMUNIZATION ADMIN: CPT | Performed by: NURSE PRACTITIONER

## 2021-07-09 PROCEDURE — 99395 PREV VISIT EST AGE 18-39: CPT | Mod: 25 | Performed by: NURSE PRACTITIONER

## 2021-07-09 PROCEDURE — 90715 TDAP VACCINE 7 YRS/> IM: CPT | Performed by: NURSE PRACTITIONER

## 2021-07-09 ASSESSMENT — FIBROSIS 4 INDEX: FIB4 SCORE: 0.87

## 2021-07-09 NOTE — PROGRESS NOTES
Subjective:   Tereso Rosen is a 36 y.o. male here today for annual, lab review    Annual physical exam  Social/Family: Single, no children  Work: Full time at [employer]  Diet: Well balanced, moderate carbs, variety meats, moderate vegetables.   Caffeine/Energy Drinks/Soda: Coffee and tea, no ED or Soda  Exercise:No aerobic exercise   Stress: Normal life stress  Sleep: No issues  Depression/Anxiety Concerns: Treated and stable with psychiatry         Current medicines (including changes today)  Current Outpatient Medications   Medication Sig Dispense Refill   • sertraline (ZOLOFT) 100 MG Tab TAKE 2 TABLETS BY MOUTH EVERY  tablet 1   • propranolol (INDERAL) 10 MG Tab Take 1 tablet by mouth 2 times a day as needed (anxiety). 30 tablet 5     No current facility-administered medications for this visit.     He  has a past medical history of Anxiety and Bronchitis. He also has no past medical history of Allergy, ASTHMA, or Diabetes.    ROS   No chest pain, no shortness of breath, no abdominal pain  Positive ROS as per HPI.  All other systems reviewed and are negative.     Objective:     /78 (BP Location: Right arm, Patient Position: Sitting, BP Cuff Size: Large adult long)   Pulse 83   Temp 36.4 °C (97.6 °F) (Temporal)   Ht 1.829 m (6')   Wt 110 kg (243 lb)   SpO2 99%  Body mass index is 32.96 kg/m².     Physical Exam:  Constitutional: Alert, no distress.  Skin: Warm, dry, good turgor, no rashes in visible areas.  Eye: Equal, round and reactive, conjunctiva clear, lids normal.  ENMT: Lips without lesions, good dentition, oropharynx clear.  Neck: Trachea midline, no masses, no thyromegaly. No cervical or supraclavicular lymphadenopathy  Respiratory: Unlabored respiratory effort, lungs clear to auscultation, no wheezes, no ronchi.  Cardiovascular: Normal S1, S2, no murmur, no edema.  Abdomen: Soft, non-tender, no masses, no hepatosplenomegaly.  Psych: Alert and oriented x3, normal affect and  mood.        Assessment and Plan:   The following treatment plan was discussed    1. Annual physical exam  Patient and I discussed the importance of lifestyle changes, with particular emphasis on decreasing sugar and carbohydrate intake and increasing plant-based nutrition (for the purposes of weight loss, general health, and prevention of chronic illnesses), as well as regular cardiovascular exercise, proper sleep, and stress management. Patient verbalized understanding.      2. Elevated fasting glucose  Unstable  A1C normal  Discussed diet and exercise in depth    3. Family history of diabetes mellitus  As above    4. Need for vaccination  - Tdap =>6yo IM      Followup: Return in about 1 year (around 7/9/2022).    I have placed the below orders and discussed them with an approved delegating provider. The MA is performing the below orders under the direction of Dr. Rodrigues

## 2021-07-09 NOTE — ASSESSMENT & PLAN NOTE
Social/Family: Single, no children  Work: Full time at [employer]  Diet: Well balanced, moderate carbs, variety meats, moderate vegetables.   Caffeine/Energy Drinks/Soda: Coffee and tea, no ED or Soda  Exercise:No aerobic exercise   Stress: Normal life stress  Sleep: No issues  Depression/Anxiety Concerns: Treated and stable with psychiatry

## 2021-07-26 ENCOUNTER — APPOINTMENT (OUTPATIENT)
Dept: DERMATOLOGY | Facility: IMAGING CENTER | Age: 36
End: 2021-07-26
Payer: COMMERCIAL

## 2021-08-13 ENCOUNTER — OFFICE VISIT (OUTPATIENT)
Dept: DERMATOLOGY | Facility: IMAGING CENTER | Age: 36
End: 2021-08-13
Payer: COMMERCIAL

## 2021-08-13 VITALS — HEIGHT: 72 IN | TEMPERATURE: 98 F | BODY MASS INDEX: 32.96 KG/M2

## 2021-08-13 DIAGNOSIS — B07.9 VERRUCA(E): ICD-10-CM

## 2021-08-13 DIAGNOSIS — L05.91 PILONIDAL CYST WITHOUT ABSCESS: ICD-10-CM

## 2021-08-13 DIAGNOSIS — D48.5 NEOPLASM OF UNCERTAIN BEHAVIOR OF SKIN: ICD-10-CM

## 2021-08-13 PROCEDURE — 17110 DESTRUCTION B9 LES UP TO 14: CPT | Performed by: NURSE PRACTITIONER

## 2021-08-13 PROCEDURE — 11102 TANGNTL BX SKIN SINGLE LES: CPT | Mod: 59 | Performed by: NURSE PRACTITIONER

## 2021-08-13 PROCEDURE — 99213 OFFICE O/P EST LOW 20 MIN: CPT | Mod: 25 | Performed by: NURSE PRACTITIONER

## 2021-08-13 NOTE — PROGRESS NOTES
DERMATOLOGY NOTE  NEW VISIT       Chief complaint: Establish Care and Skin Lesion     HPI/location: cyst on tailbone  Time present: off and on for 20+ years, but became infected 4 months ago (April 2021)  Painful lesion: Yes  Itching lesion: No  Enlarging lesion: Yes  Anything make it better or worse? Physical activity makes it worse.    HPI/location: left temple  Time present: started changing 1 year ago, has had his whole life and was flat  Painful lesion: No  Itching lesion: No  Enlarging lesion: Yes  Anything make it better or worse? No      History of skin cancer: No  History of precancers/actinic keratoses: No  History of biopsies:Yes, Details: cyst removal on right cheek  History of blistering/severe sunburns:No  Family history of skin cancer:Yes, Details: mother - unsure of type  Family history of atypical moles:No      Allergies   Allergen Reactions   • Clindamycin Base Nausea        MEDICATIONS:  Medications relevant to specialty reviewed.     REVIEW OF SYSTEMS:   Positive for skin (see HPI)  Negative for fevers and chills       EXAM:  Temp 36.7 °C (98 °F) (Temporal)   Ht 1.829 m (6')   BMI 32.96 kg/m²   Constitutional: Well-developed, well-nourished, and in no distress.     A focused skin exam was performed including the affected areas of the head (including face), groin/buttocks and bilateral upper extremities. Notable findings on exam today listed below and/or in assessment/plan.     pilonidal cyst without abscess noted to gluteal cleft  Wart noted to left hand palmar aspect  7mm dark brown papule to left temple    IMPRESSION / PLAN:    1. Neoplasm of uncertain behavior of skin  Procedure Note   Procedure: Biopsy by shave technique  Location: left temple  Size: as noted in exam  Preoperative diagnosis:nevus  Risks, benefits and alternatives of procedure discussed and written informed consent obtained for procedure and verbal consent for photos. Time out completed. Area of biopsy prepped with alcohol.  Anesthesia with 1% lidocaine with epinephrine administered with 30 gauge needle. Shave biopsy of the site performed. Hemostasis achieved with pressure and aluminum chloride. Vaseline applied to wound with bandage. Patient tolerated procedure well and there were no complications. The specimen was sent to the pathology lab by the staff. Wound care was discussed.      2. Verruca(e)  CRYOTHERAPY:  Risks (including, but not limited to: hypo or hyperpigmentation, redness, blister, blood blister, recurrence, need for further treatment, infection, scar) and benefits of cryotherapy discussed. Patient verbally agreed to proceed with treatment. 2 cryotherapy freeze thaw cycles of 10 seconds were applied to 1 lesion on left palmar aspect with cryac. Patient tolerated procedure well. Aftercare instructions given.      3. Pilonidal cyst without abscess    - REFERRAL TO GENERAL SURGERY       Please note that this dictation was created using voice recognition software. I have made every reasonable attempt to correct obvious errors, but I expect that there are errors of grammar and possibly content that I did not discover before finalizing the note.      Return to clinic in: Return in about 4 weeks (around 9/10/2021) for wart trx. and as needed for any new or changing skin lesions.

## 2021-08-25 ENCOUNTER — TELEPHONE (OUTPATIENT)
Dept: DERMATOLOGY | Facility: IMAGING CENTER | Age: 36
End: 2021-08-25

## 2021-10-07 ENCOUNTER — TELEMEDICINE (OUTPATIENT)
Dept: BEHAVIORAL HEALTH | Facility: CLINIC | Age: 36
End: 2021-10-07
Payer: COMMERCIAL

## 2021-10-07 DIAGNOSIS — F40.00 AGORAPHOBIA: ICD-10-CM

## 2021-10-07 DIAGNOSIS — F43.10 POSTTRAUMATIC STRESS DISORDER: ICD-10-CM

## 2021-10-07 PROCEDURE — 99214 OFFICE O/P EST MOD 30 MIN: CPT | Mod: 95 | Performed by: PSYCHIATRY & NEUROLOGY

## 2021-10-07 RX ORDER — SERTRALINE HYDROCHLORIDE 100 MG/1
200 TABLET, FILM COATED ORAL DAILY
Qty: 180 TABLET | Refills: 1 | Status: SHIPPED | OUTPATIENT
Start: 2021-10-07 | End: 2022-04-07

## 2021-10-07 NOTE — PROGRESS NOTES
This evaluation was conducted via Zoom using secure and encrypted videoconferencing technology. The patient was in a private location in the [facility].    The patient's identity was confirmed and verbal consent was obtained for this virtual visit.     PSYCHIATRY FOLLOW-UP NOTE      Name: Tereso Rosen  MRN: [id number]  : 1985  Age: 36 y.o.  Date of assessment: 10/7/2021  PCP: [facility]  Persons in attendance: Patient      REASON FOR VISIT/CHIEF COMPLAINT (as stated by Patient):  Tereso Rosen is a 36 y.o., White male, attending follow-up appointment for mood and anxiety management.      HISTORY OF PRESENT ILLNESS:  Tereso Rosen is a 36 y.o. old male with PTSD and agorophobia comes in today for follow up. Patient was last seen 6 months ago, and following treatment planning recommendations were done:  · Continue Sertraline to 200 mg daily for PTSD management. given 3 month with 1 refill supply.   · Continue Inderal 10 mg BID PRN for anxiety related to driving: agreed with testing first dose of signs of hypotension & initially driving with a friend. Given 30 tabs with 5 refills.  · Patient is not interested in psychotherapy at this time.      Patient is compliant with medications with no side effect and reports stable anxiety, mood and denies any PTSD related symptoms.  Patient reports no longer having emotional reactivity and able to function well at work and outside work.  Patient agreed with plan of continuing sertraline at same dosage but agreed with plan of reducing the dose to 150 mg if patient remained stable for next 6 months.  Patient has not used Inderal since last session.      CURRENT MEDICATIONS:  Current Outpatient Medications   Medication Sig Dispense Refill   • sertraline (ZOLOFT) 100 MG Tab TAKE 2 TABLETS BY MOUTH EVERY  tablet 1   • propranolol (INDERAL) 10 MG Tab Take 1 tablet by mouth 2 times a day as needed (anxiety). (Patient  not taking: Reported on 8/13/2021) 30 tablet 5     No current facility-administered medications for this visit.       MEDICAL HISTORY  Past Medical History:   Diagnosis Date   • Anxiety    • Bronchitis      No past surgical history on file.    PAST PSYCHIATRIC HISTORY  Prior psychiatric hospitalization: none  Prior Self harm/suicide attempt: no  Prior Diagnosis: PTSD     PAST PSYCHIATRIC MEDICATIONS  Sertraline- did not work at lower dose, started working at 100 mg daily  Buspirone 10mg PO BID - not helpful  Hydroxyzine 25-50mg TID PRN - has not needed to take it in a few months     FAMILY HISTORY  Psychiatric diagnosis:  none  History of suicide attempts:  no  Substance abuse history:  no     SUBSTANCE USE HISTORY:  ALCOHOL: no alcohol for > 2 1/2 years  TOBACCO: no  CANNABIS: no  OPIOIDS: no  PRESCRIPTION MEDICATIONS: no  OTHERS: no  History of inpatient/outpatient rehab treatment: none     SOCIAL HISTORY  Childhood: born in [place] and describes childhood as ok  Education: 2 yr college  in Special Education: no  Intellectual Disability: no  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      REVIEW OF SYSTEMS:        Constitutional negative   Eyes negative   Ears/Nose/Mouth/Throat negative   Cardiovascular negative   Respiratory negative   Gastrointestinal negative   Genitourinary negative   Muscular negative   Integumentary negative   Neurological negative    Endocrine negative   Hematologic/Lymphatic negative     PHYSICAL EXAMINAION:  Vital signs: There were no vitals taken for this visit.  Musculoskeletal: Normal gait.   Abnormal movements: none      MENTAL STATUS EXAMINATION      General:   - Grooming and hygiene: Casual,   - Apparent distress: none,   - Behavior: Calm  - Eye Contact:  Good,   - no psychomotor agitation or retardation    - Participation: Active verbal participation  Orientation: Alert and Fully Oriented to person, place and time  Mood: Euthymic  Affect: Flexible and Full range,  Thought Process: Logical and Goal-directed  Thought Content: Denies suicidal or homicidal ideations, intent or plan Within normal limits  Perception: Denies auditory or visual hallucinations. No delusions noted Within normal limits  Attention span and concentration: Intact   Speech:Rate within normal limits and Volume within normal limits  Language: Appropriate   Insight: Good  Judgment: Good  Recent and remote memory: No gross evidence of memory deficits        DEPRESSION SCREENING:  Depression Screen (PHQ-2/PHQ-9) 7/3/2019 4/30/2020 3/2/2021   PHQ-2 Total Score 0 0 0       Interpretation of PHQ-9 Total Score   Score Severity   1-4 No Depression   5-9 Mild Depression   10-14 Moderate Depression   15-19 Moderately Severe Depression   20-27 Severe Depression    CURRENT RISK:       Suicidal: Low       Homicidal: Low       Self-Harm: Low       Relapse: Low       Crisis Safety Plan Reviewed Not Indicated       If evidence of imminent risk is present, intervention/plan:      MEDICAL RECORDS/LABS/DIAGNOSTIC TESTS REVIEWED:  Component      Latest Ref Rng & Units 6/30/2021           4:47 AM   TSH      0.450 - 4.500 uIU/mL 1.030     Component      Latest Ref Rng & Units 6/30/2021           4:47 AM   Glucose      65 - 99 mg/dL 113 (H)   Bun      6 - 20 mg/dL 14   Creatinine      0.76 - 1.27 mg/dL 0.80   GFR If Non       >59 mL/min/1.73 113   GFR If        >59 mL/min/1.73 131   Bun-Creatinine Ratio      9 - 20 18   Sodium      134 - 144 mmol/L 141   Potassium      3.5 - 5.2 mmol/L 4.9   Chloride      96 - 106 mmol/L 105   Co2      20 - 29 mmol/L 23   Calcium      8.7 - 10.2 mg/dL 9.4   Total Protein      6.0 - 8.5 g/dL 6.9   Albumin      4.0 - 5.0 g/dL 4.7   Globulin      1.5 - 4.5 g/dL 2.2   A-G Ratio      1.2 - 2.2 2.1   Total Bilirubin      0.0 - 1.2 mg/dL 0.3   Alkaline Phosphatase      48 - 121 IU/L 84   AST(SGOT)      0 - 40 IU/L 16   ALT(SGPT)      0 - 44 IU/L 14     Component      Latest Ref Rng & Units 6/30/2021           4:47 AM   WBC      3.4 - 10.8 x10E3/uL 7.0   RBC      4.14 - 5.80 x10E6/uL 5.18   Hemoglobin      13.0 - 17.7 g/dL 15.2   Hematocrit      37.5 - 51.0 % 46.7   MCV      79 - 97 fL 90   MCH      26.6 - 33.0 pg 29.3   MCHC      31.5 - 35.7 g/dL 32.5   RDW      11.6 - 15.4 % 12.6   Platelet Count      150 - 450 x10E3/uL 187   Neutrophils-Polys      Not Estab. % 65   Lymphocytes      Not Estab. % 27   Monocytes      Not Estab. % 6   Eosinophils      Not Estab. % 1   Basophils      Not Estab. % 1   Immature Cells       CANCELED   Neutrophils (Absolute)      1.4 - 7.0 x10E3/uL 4.5   Lymphs (Absolute)      0.7 - 3.1 x10E3/uL 1.9   Monos (Absolute)      0.1 - 0.9 x10E3/uL 0.4   Eos (Absolute)      0.0 - 0.4 x10E3/uL 0.1   Baso (Absolute)      0.0 - 0.2 x10E3/uL 0.1   Immature Granulocytes      Not Estab. % 0   Immature Granulocytes (abs)      0.0 - 0.1 x10E3/uL 0.0   Nucleated RBC       CANCELED   Comments-Diff       CANCELED       [place]  records -   Reviewed     DIAGNOSTIC IMPRESSION(S):  1. PTSD  2. Agoraphobia     PLAN:  (1) PTSD, (2) Agoraphobia  · Improving  · Conitnue Sertraline 200 mg daily for PTSD management.  given 3 month with 1 refill supply. Consider dose reduction in next session is remains stable.  · Patient is not interested in psychotherapy at this time.  · Medication options, alternatives (including no medications) and medication  risks/benefits/side effects were discussed in detail.  · Explained importance of contraceptive measures while on psychotropic medications, educated to let provider know if ever pregnant or wanting to become pregnant. Verbalized understanding.  · The patient was advised to call, message provider on MyChart, or come in to the clinic if symptoms worsen or if any future questions/issues regarding their medications arise; the patient verbalized understanding and agreement.    · The patient was educated to call 911, call the suicide hotline, or go to local ER if having thoughts of suicide or homicide; verbalized understanding.     Billing Coding based on:  47508: based on MDM    Return to clinic in 6 months or sooner if symptoms worsen.  Next Appointment: instruction provided on how to make the next appointment.     The proposed treatment plan was discussed with the patient who was provided the opportunity to ask questions and make suggestions regarding alternative treatment. Patient verbalized understanding and expressed agreement with the plan.       Skyler Ugarte M.D.  10/07/21    This note was created using voice recognition software (Dragon). The accuracy of the dictation is limited by the abilities of the software. I have reviewed the note prior to signing, however some errors in grammar and context are still possible. If you have any questions related to this note please do not hesitate to contact our office.

## 2021-10-08 ENCOUNTER — PRE-ADMISSION TESTING (OUTPATIENT)
Dept: ADMISSIONS | Facility: MEDICAL CENTER | Age: 36
End: 2021-10-08
Attending: SURGERY
Payer: COMMERCIAL

## 2021-10-08 DIAGNOSIS — Z01.812 PRE-OPERATIVE LABORATORY EXAMINATION: ICD-10-CM

## 2021-10-08 PROCEDURE — U0003 INFECTIOUS AGENT DETECTION BY NUCLEIC ACID (DNA OR RNA); SEVERE ACUTE RESPIRATORY SYNDROME CORONAVIRUS 2 (SARS-COV-2) (CORONAVIRUS DISEASE [COVID-19]), AMPLIFIED PROBE TECHNIQUE, MAKING USE OF HIGH THROUGHPUT TECHNOLOGIES AS DESCRIBED BY CMS-2020-01-R: HCPCS

## 2021-10-08 PROCEDURE — C9803 HOPD COVID-19 SPEC COLLECT: HCPCS

## 2021-10-08 PROCEDURE — U0005 INFEC AGEN DETEC AMPLI PROBE: HCPCS

## 2021-10-08 ASSESSMENT — FIBROSIS 4 INDEX: FIB4 SCORE: 0.87

## 2021-10-09 LAB
SARS-COV-2 RNA RESP QL NAA+PROBE: NOTDETECTED
SPECIMEN SOURCE: NORMAL

## 2021-10-14 NOTE — OR NURSING
COVID-19 Pre-surgery screening:  ?  1. Do you have an undiagnosed respiratory illness or symptoms such as coughing or sneezing, SOB, loss of taste or smell? (No)      2. Do you have an unexplained fever greater than 100.4 degrees Fahrenheit or 38 degrees Celsius? (No)     3. Have you had direct exposure to a patient who tested positive for Covid-19? (No)  ?  4. Have you traveled within the last 14 days? (No)  ?  Informed of visitor policy and mask use requirement  YES

## 2021-10-15 ENCOUNTER — HOSPITAL ENCOUNTER (OUTPATIENT)
Facility: MEDICAL CENTER | Age: 36
End: 2021-10-15
Attending: SURGERY | Admitting: SURGERY
Payer: COMMERCIAL

## 2021-10-15 ENCOUNTER — ANESTHESIA (OUTPATIENT)
Dept: SURGERY | Facility: MEDICAL CENTER | Age: 36
End: 2021-10-15
Payer: COMMERCIAL

## 2021-10-15 ENCOUNTER — ANESTHESIA EVENT (OUTPATIENT)
Dept: SURGERY | Facility: MEDICAL CENTER | Age: 36
End: 2021-10-15
Payer: COMMERCIAL

## 2021-10-15 VITALS
HEART RATE: 85 BPM | WEIGHT: 238.1 LBS | HEIGHT: 72 IN | DIASTOLIC BLOOD PRESSURE: 78 MMHG | OXYGEN SATURATION: 93 % | BODY MASS INDEX: 32.25 KG/M2 | RESPIRATION RATE: 16 BRPM | TEMPERATURE: 96.7 F | SYSTOLIC BLOOD PRESSURE: 120 MMHG

## 2021-10-15 DIAGNOSIS — G89.18 ACUTE POST-OPERATIVE PAIN: ICD-10-CM

## 2021-10-15 LAB — PATHOLOGY CONSULT NOTE: NORMAL

## 2021-10-15 PROCEDURE — 160048 HCHG OR STATISTICAL LEVEL 1-5: Performed by: SURGERY

## 2021-10-15 PROCEDURE — 700105 HCHG RX REV CODE 258: Performed by: SURGERY

## 2021-10-15 PROCEDURE — 160002 HCHG RECOVERY MINUTES (STAT): Performed by: SURGERY

## 2021-10-15 PROCEDURE — 160046 HCHG PACU - 1ST 60 MINS PHASE II: Performed by: SURGERY

## 2021-10-15 PROCEDURE — 160038 HCHG SURGERY MINUTES - EA ADDL 1 MIN LEVEL 2: Performed by: SURGERY

## 2021-10-15 PROCEDURE — 160025 RECOVERY II MINUTES (STATS): Performed by: SURGERY

## 2021-10-15 PROCEDURE — 160035 HCHG PACU - 1ST 60 MINS PHASE I: Performed by: SURGERY

## 2021-10-15 PROCEDURE — 88304 TISSUE EXAM BY PATHOLOGIST: CPT

## 2021-10-15 PROCEDURE — 160027 HCHG SURGERY MINUTES - 1ST 30 MINS LEVEL 2: Performed by: SURGERY

## 2021-10-15 PROCEDURE — 501838 HCHG SUTURE GENERAL: Performed by: SURGERY

## 2021-10-15 PROCEDURE — A6403 STERILE GAUZE>16 <= 48 SQ IN: HCPCS | Performed by: SURGERY

## 2021-10-15 PROCEDURE — 700111 HCHG RX REV CODE 636 W/ 250 OVERRIDE (IP): Performed by: STUDENT IN AN ORGANIZED HEALTH CARE EDUCATION/TRAINING PROGRAM

## 2021-10-15 PROCEDURE — 700101 HCHG RX REV CODE 250: Performed by: STUDENT IN AN ORGANIZED HEALTH CARE EDUCATION/TRAINING PROGRAM

## 2021-10-15 PROCEDURE — 160009 HCHG ANES TIME/MIN: Performed by: SURGERY

## 2021-10-15 PROCEDURE — 700101 HCHG RX REV CODE 250: Performed by: SURGERY

## 2021-10-15 RX ORDER — ONDANSETRON 2 MG/ML
INJECTION INTRAMUSCULAR; INTRAVENOUS PRN
Status: DISCONTINUED | OUTPATIENT
Start: 2021-10-15 | End: 2021-10-15 | Stop reason: SURG

## 2021-10-15 RX ORDER — OXYCODONE HCL 5 MG/5 ML
10 SOLUTION, ORAL ORAL
Status: DISCONTINUED | OUTPATIENT
Start: 2021-10-15 | End: 2021-10-15 | Stop reason: HOSPADM

## 2021-10-15 RX ORDER — MIDAZOLAM HYDROCHLORIDE 1 MG/ML
INJECTION INTRAMUSCULAR; INTRAVENOUS PRN
Status: DISCONTINUED | OUTPATIENT
Start: 2021-10-15 | End: 2021-10-15 | Stop reason: SURG

## 2021-10-15 RX ORDER — LIDOCAINE HYDROCHLORIDE 20 MG/ML
INJECTION, SOLUTION EPIDURAL; INFILTRATION; INTRACAUDAL; PERINEURAL PRN
Status: DISCONTINUED | OUTPATIENT
Start: 2021-10-15 | End: 2021-10-15 | Stop reason: SURG

## 2021-10-15 RX ORDER — HYDROMORPHONE HYDROCHLORIDE 1 MG/ML
0.2 INJECTION, SOLUTION INTRAMUSCULAR; INTRAVENOUS; SUBCUTANEOUS
Status: DISCONTINUED | OUTPATIENT
Start: 2021-10-15 | End: 2021-10-15 | Stop reason: HOSPADM

## 2021-10-15 RX ORDER — LABETALOL HYDROCHLORIDE 5 MG/ML
5 INJECTION, SOLUTION INTRAVENOUS
Status: DISCONTINUED | OUTPATIENT
Start: 2021-10-15 | End: 2021-10-15 | Stop reason: HOSPADM

## 2021-10-15 RX ORDER — HYDROMORPHONE HYDROCHLORIDE 1 MG/ML
0.1 INJECTION, SOLUTION INTRAMUSCULAR; INTRAVENOUS; SUBCUTANEOUS
Status: DISCONTINUED | OUTPATIENT
Start: 2021-10-15 | End: 2021-10-15 | Stop reason: HOSPADM

## 2021-10-15 RX ORDER — CEFAZOLIN SODIUM 1 G/3ML
INJECTION, POWDER, FOR SOLUTION INTRAMUSCULAR; INTRAVENOUS PRN
Status: DISCONTINUED | OUTPATIENT
Start: 2021-10-15 | End: 2021-10-15 | Stop reason: SURG

## 2021-10-15 RX ORDER — ONDANSETRON 2 MG/ML
4 INJECTION INTRAMUSCULAR; INTRAVENOUS
Status: DISCONTINUED | OUTPATIENT
Start: 2021-10-15 | End: 2021-10-15 | Stop reason: HOSPADM

## 2021-10-15 RX ORDER — DIPHENHYDRAMINE HYDROCHLORIDE 50 MG/ML
12.5 INJECTION INTRAMUSCULAR; INTRAVENOUS
Status: DISCONTINUED | OUTPATIENT
Start: 2021-10-15 | End: 2021-10-15 | Stop reason: HOSPADM

## 2021-10-15 RX ORDER — DEXAMETHASONE SODIUM PHOSPHATE 4 MG/ML
INJECTION, SOLUTION INTRA-ARTICULAR; INTRALESIONAL; INTRAMUSCULAR; INTRAVENOUS; SOFT TISSUE PRN
Status: DISCONTINUED | OUTPATIENT
Start: 2021-10-15 | End: 2021-10-15 | Stop reason: SURG

## 2021-10-15 RX ORDER — BUPIVACAINE HYDROCHLORIDE AND EPINEPHRINE 5; 5 MG/ML; UG/ML
INJECTION, SOLUTION EPIDURAL; INTRACAUDAL; PERINEURAL
Status: DISCONTINUED | OUTPATIENT
Start: 2021-10-15 | End: 2021-10-15 | Stop reason: HOSPADM

## 2021-10-15 RX ORDER — OXYCODONE HCL 5 MG/5 ML
5 SOLUTION, ORAL ORAL
Status: DISCONTINUED | OUTPATIENT
Start: 2021-10-15 | End: 2021-10-15 | Stop reason: HOSPADM

## 2021-10-15 RX ORDER — HYDROMORPHONE HYDROCHLORIDE 1 MG/ML
0.4 INJECTION, SOLUTION INTRAMUSCULAR; INTRAVENOUS; SUBCUTANEOUS
Status: DISCONTINUED | OUTPATIENT
Start: 2021-10-15 | End: 2021-10-15 | Stop reason: HOSPADM

## 2021-10-15 RX ORDER — OXYCODONE HYDROCHLORIDE AND ACETAMINOPHEN 5; 325 MG/1; MG/1
1 TABLET ORAL EVERY 6 HOURS PRN
Qty: 12 TABLET | Refills: 0 | Status: SHIPPED | OUTPATIENT
Start: 2021-10-15 | End: 2021-10-18

## 2021-10-15 RX ORDER — HALOPERIDOL 5 MG/ML
1 INJECTION INTRAMUSCULAR
Status: DISCONTINUED | OUTPATIENT
Start: 2021-10-15 | End: 2021-10-15 | Stop reason: HOSPADM

## 2021-10-15 RX ORDER — SODIUM CHLORIDE, SODIUM LACTATE, POTASSIUM CHLORIDE, CALCIUM CHLORIDE 600; 310; 30; 20 MG/100ML; MG/100ML; MG/100ML; MG/100ML
INJECTION, SOLUTION INTRAVENOUS CONTINUOUS
Status: ACTIVE | OUTPATIENT
Start: 2021-10-15 | End: 2021-10-15

## 2021-10-15 RX ORDER — HYDRALAZINE HYDROCHLORIDE 20 MG/ML
5 INJECTION INTRAMUSCULAR; INTRAVENOUS
Status: DISCONTINUED | OUTPATIENT
Start: 2021-10-15 | End: 2021-10-15 | Stop reason: HOSPADM

## 2021-10-15 RX ORDER — METOPROLOL TARTRATE 1 MG/ML
1 INJECTION, SOLUTION INTRAVENOUS
Status: DISCONTINUED | OUTPATIENT
Start: 2021-10-15 | End: 2021-10-15 | Stop reason: HOSPADM

## 2021-10-15 RX ORDER — LIDOCAINE HYDROCHLORIDE 40 MG/ML
SOLUTION TOPICAL PRN
Status: DISCONTINUED | OUTPATIENT
Start: 2021-10-15 | End: 2021-10-15 | Stop reason: SURG

## 2021-10-15 RX ORDER — MEPERIDINE HYDROCHLORIDE 25 MG/ML
12.5 INJECTION INTRAMUSCULAR; INTRAVENOUS; SUBCUTANEOUS
Status: DISCONTINUED | OUTPATIENT
Start: 2021-10-15 | End: 2021-10-15 | Stop reason: HOSPADM

## 2021-10-15 RX ADMIN — LIDOCAINE HYDROCHLORIDE 100 MG: 20 INJECTION, SOLUTION EPIDURAL; INFILTRATION; INTRACAUDAL at 07:37

## 2021-10-15 RX ADMIN — FENTANYL CITRATE 100 MCG: 50 INJECTION, SOLUTION INTRAMUSCULAR; INTRAVENOUS at 07:36

## 2021-10-15 RX ADMIN — ONDANSETRON 4 MG: 2 INJECTION INTRAMUSCULAR; INTRAVENOUS at 08:06

## 2021-10-15 RX ADMIN — SODIUM CHLORIDE, POTASSIUM CHLORIDE, SODIUM LACTATE AND CALCIUM CHLORIDE: 600; 310; 30; 20 INJECTION, SOLUTION INTRAVENOUS at 06:14

## 2021-10-15 RX ADMIN — CEFAZOLIN 2 G: 330 INJECTION, POWDER, FOR SOLUTION INTRAMUSCULAR; INTRAVENOUS at 07:37

## 2021-10-15 RX ADMIN — DEXAMETHASONE SODIUM PHOSPHATE 8 MG: 4 INJECTION, SOLUTION INTRA-ARTICULAR; INTRALESIONAL; INTRAMUSCULAR; INTRAVENOUS; SOFT TISSUE at 07:40

## 2021-10-15 RX ADMIN — LIDOCAINE HYDROCHLORIDE 4 ML: 40 SOLUTION TOPICAL at 07:37

## 2021-10-15 RX ADMIN — Medication 100 MG: at 07:37

## 2021-10-15 RX ADMIN — PROPOFOL 150 MG: 10 INJECTION, EMULSION INTRAVENOUS at 07:36

## 2021-10-15 RX ADMIN — ROCURONIUM BROMIDE 15 MG: 10 INJECTION, SOLUTION INTRAVENOUS at 07:51

## 2021-10-15 RX ADMIN — MIDAZOLAM HYDROCHLORIDE 2 MG: 1 INJECTION, SOLUTION INTRAMUSCULAR; INTRAVENOUS at 07:33

## 2021-10-15 RX ADMIN — SUGAMMADEX 200 MG: 100 INJECTION, SOLUTION INTRAVENOUS at 08:03

## 2021-10-15 ASSESSMENT — FIBROSIS 4 INDEX: FIB4 SCORE: 0.87

## 2021-10-15 ASSESSMENT — PAIN SCALES - GENERAL: PAIN_LEVEL: 0

## 2021-10-15 NOTE — ANESTHESIA PROCEDURE NOTES
Airway    Date/Time: 10/15/2021 7:37 AM  Performed by: Juanito Pisano M.D.  Authorized by: Juanito Pisano M.D.     Location:  OR  Urgency:  Elective  Difficult Airway: No    Indications for Airway Management:  Anesthesia      Spontaneous Ventilation: absent    Sedation Level:  Deep  Preoxygenated: Yes    Patient Position:  Sniffing  Mask Difficulty Assessment:  0 - not attempted  Final Airway Type:  Endotracheal airway  Final Endotracheal Airway:  ETT  Cuffed: Yes    Technique Used for Successful ETT Placement:  Direct laryngoscopy    Insertion Site:  Oral  Blade Type:  [place]  Laryngoscope Blade/Videolaryngoscope Blade Size:  2  ETT Size (mm):  8.0  Measured from:  Teeth  ETT to Teeth (cm):  24  Placement Verified by: auscultation and capnometry    Cormack-Lehane Classification:  Grade I - full view of glottis  Number of Attempts at Approach:  1  Ventilation Between Attempts:  None

## 2021-10-15 NOTE — OR NURSING
0822 Pt out from OR. Pt sleeping. VSS.   0835 Tolerating orals  0977 Report to KARLA Gannon. Pt over to Phase II

## 2021-10-15 NOTE — OR NURSING
Discharge information reviewed with patient and responsible adult. No questions or concerns at this time. IV DC'd.Patient declined wheelchair. Ambulated to care with friend at side.

## 2021-10-15 NOTE — OP REPORT
DATE OF OPERATION: 10/15/2021    PREOPERATIVE DIAGNOSIS: chronic pilonidal cyst    POSTOPERATIVE DIAGNOSIS: Same.    PROCEDURE PERFORMED:excision and primary closure of pilonidal cyst.     SURGEON: Kvng Banks M.D.    ASSISTANT: Stefanie CLAIRE  The indications for a surgical assistant in this surgery were indicated due to complexity of the procedure. Their role included aiding in incision, retraction, holding devices including camera for laparoscopic procedure, and closure of the wound.      ANESTHESIOLOGIST:  Juanito Pisano MD    ANESTHESIA: General endotracheal anesthesia.    INDICATIONS: The patient is a 36 y.o. male with a chronic pilonidal cyst of several months duration requiring incision and drainage limited to the midline and currently without infection.  Excision and primary closure were elected for management.    FINDINGS: 5cm Pilonidal cyst excised     ESTIMATED BLOOD LOSS: 5 mL.    PROCEDURE:Informed consent was obtained pre-operatively.  The patient was taken back to the operating room and general endotracheal anesthesia was administered and the patient was intubated. Patient was then transferred to the operating room table in the prone jackknife position with all skin and joint surfaces padded and protected appropriately.Intravenous antibiotics were administered by the anesthesiologist in correct time interval. Sequential compression devices were placed.  The buttocks were gently spread with tape in the presacral region was prepped and draped in usual sterile fashion.  A time-out was performed and the patient and procedure were both verified.      Several openings were evident on the midline.  The opening was cannulated with a small  probe was inserted in the midline tract readily identified.  An elliptical incision was made around the opening and the entire tract.  This was deepened through the subcutaneous tissues using electrocautery.  The incision was continued into the fascia and  normal tissue deep to the tract was encountered.  The pilonidal sinus tract and all of the associated tissue was then excised cleanly in its entirety.    Hemostasis was achieved with electrocautery.  After ensuring that there was no active infection of the wound is clean  Flaps were developed until the skin and subcutaneous tissues could be approximated in the midline without tension.  The incision was then closed with interrupted vertical typical mattress sutures of 3-0 nylon placed in such a fashion as to completely close the dead space by incorporating a bite of deep tissue and fascia.  The wound was then dressed.    The patient tolerated the procedure well and there were no apparent complications. All sponge, sharps, and instrument counts were correct on 2 separate occasions. The patient was awakened, extubated, and transferred to the PACU in satisfactory condition.     Kvng Banks MD PhD  [facility]  Colon and Rectal Surgeon  [phone]

## 2021-10-15 NOTE — ANESTHESIA POSTPROCEDURE EVALUATION
Patient: Tereso Rosen    Procedure Summary     Date: 10/15/21 Room / Location: [place] [address] / SURGERY [facility]    Anesthesia Start: 0733 Anesthesia Stop: 0823    Procedure: EXCISION, PILONIDAL CYST (Buttocks) Diagnosis: (PILONIDAL CYST)    Surgeons: Kvng Banks M.D. Responsible Provider: Juanito Pisano M.D.    Anesthesia Type: general ASA Status: 1          Final Anesthesia Type: general  Last vitals  BP   Blood Pressure: 120/78    Temp   35.9 °C (96.7 °F)    Pulse   85   Resp   16    SpO2   93 %      Anesthesia Post Evaluation    Patient location during evaluation: PACU  Patient participation: complete - patient participated  Level of consciousness: awake and alert  Pain score: 0    Airway patency: patent  Anesthetic complications: no  Cardiovascular status: hemodynamically stable  Respiratory status: acceptable  Hydration status: euvolemic    PONV: none          No complications documented.     Nurse Pain Score: 0 (NPRS)         methotrexate in possession of dr Sharmaine Hills, to be administered by physician     Sosa Johnson, RN  06/23/21 [id number]

## 2021-10-15 NOTE — DISCHARGE INSTRUCTIONS
D/C instructions:    1. DIET: Upon discharge from the hospital you may resume your normal preoperative diet. Depending on how you are feeling and whether you have nausea or not, you may wish to stay with a bland diet for the first few days. However, you can advance this as quickly as you feel ready.    2. ACTIVITIES: After discharge from the hospital, you may resume full routine activities. Avoid friction on sacral area (sit ups, row machines, etc...).     3. DRIVING: You may drive whenever you are off pain medications and are able to perform the activities needed to drive, i.e. turning, bending, twisting, etc.    4. BATHING: You may get the wound wet at any time after leaving the hospital. You may shower, but do not submerge in a bath for at least a week.     5. BOWEL FUNCTION: Constipation is common after an operation, especially with pain medications. The combination of pain medication and decreased activity level can cause constipation in otherwise normal patients. If you feel this is occurring, take a laxative (Milk of Magnesia, Ex-Lax, Senokot, etc.) until the problem has resolved.    6. PAIN MEDICATION: You will be given a prescription for pain medication at discharge. Please take these as directed. It is important to remember not to take medications on an empty stomach as this may cause nausea.    7.CALL IF YOU HAVE: (1) Fevers to more than 1010 F, (2) Unusual chest or leg pain, (3) Drainage or fluid from incision that may be foul smelling, increased tenderness or soreness at the wound or the wound edges are no longer together, redness or swelling at the incision site. Please do not hesitate to call with any other questions.     8. APPOINTMENT: Contact our office at [phone] for a follow-up appointment in 2 weeks following your procedure.    If you have any additional questions, please do not hesitate to call the office and speak to either myself or the physician on call.    Office address:  [address]  [place] [address]  DAKOTAH Fong  [facility]  Colon and Rectal Surgery  [phone]    ACTIVITY: Rest and take it easy for the first 24 hours.  A responsible adult is recommended to remain with you during that time.  It is normal to feel sleepy.  We encourage you to not do anything that requires balance, judgment or coordination.    MILD FLU-LIKE SYMPTOMS ARE NORMAL. YOU MAY EXPERIENCE GENERALIZED MUSCLE ACHES, THROAT IRRITATION, HEADACHE AND/OR SOME NAUSEA.    FOR 24 HOURS DO NOT:  Drive, operate machinery or run household appliances.  Drink beer or alcoholic beverages.   Make important decisions or sign legal documents.    SPECIAL INSTRUCTIONS:See instructions above  DIET: To avoid nausea, slowly advance diet as tolerated, avoiding spicy or greasy foods for the first day.  Add more substantial food to your diet according to your physician's instructions.  Babies can be fed formula or breast milk as soon as they are hungry.  INCREASE FLUIDS AND FIBER TO AVOID CONSTIPATION.    SURGICAL DRESSING/BATHING: See above    FOLLOW-UP APPOINTMENT:  A follow-up appointment should be arranged with your doctor in ***; call to schedule.    You should CALL YOUR PHYSICIAN if you develop:  Fever greater than 101 degrees F.  Pain not relieved by medication, or persistent nausea or vomiting.  Excessive bleeding (blood soaking through dressing) or unexpected drainage from the wound.  Extreme redness or swelling around the incision site, drainage of pus or foul smelling drainage.  Inability to urinate or empty your bladder within 8 hours.  Problems with breathing or chest pain.    You should call 911 if you develop problems with breathing or chest pain.  If you are unable to contact your doctor or surgical center, you should go to the nearest emergency room or urgent care center.  Physician's telephone #: ***    If any questions arise, call your doctor.  If your doctor is not available, please feel free to call  the Surgical Center at {Surgical Dept Numbers:[id number]}. The Contact Center is open Monday through Friday 7AM to 5PM and may speak to a nurse at [phone], or toll free at [phone].     A registered nurse may call you a few days after your surgery to see how you are doing after your procedure.    MEDICATIONS: Resume taking daily medication.  Take prescribed pain medication with food.  If no medication is prescribed, you may take non-aspirin pain medication if needed.  PAIN MEDICATION CAN BE VERY CONSTIPATING.  Take a stool softener or laxative such as senokot, pericolace, or milk of magnesia if needed.        If your physician has prescribed pain medication that includes Acetaminophen (Tylenol), do not take additional Acetaminophen (Tylenol) while taking the prescribed medication.    Depression / Suicide Risk    As you are discharged from this [facility] facility, it is important to learn how to keep safe from harming yourself.    Recognize the warning signs:  · Abrupt changes in personality, positive or negative- including increase in energy   · Giving away possessions  · Change in eating patterns- significant weight changes-  positive or negative  · Change in sleeping patterns- unable to sleep or sleeping all the time   · Unwillingness or inability to communicate  · Depression  · Unusual sadness, discouragement and loneliness  · Talk of wanting to die  · Neglect of personal appearance   · Rebelliousness- reckless behavior  · Withdrawal from people/activities they love  · Confusion- inability to concentrate     If you or a loved one observes any of these behaviors or has concerns about self-harm, here's what you can do:  · Talk about it- your feelings and reasons for harming yourself  · Remove any means that you might use to hurt yourself (examples: pills, rope, extension cords, firearm)  · Get professional help from the community (Mental Health, Substance Abuse, psychological counseling)  · Do not be  alone:Call your Safe Contact- someone whom you trust who will be there for you.  · Call your local CRISIS HOTLINE 765-2396 or 723-926-7867  · Call your local Children's Mobile Crisis Response Team [place] (861) 576-7530 or www.[employer]  · Call the toll free National Suicide Prevention Hotlines   · National Suicide Prevention Lifeline 624-787-OZVR (6406)  · National Augure Line Network 800-SUICIDE (997-1318)

## 2021-10-15 NOTE — ANESTHESIA TIME REPORT
Anesthesia Start and Stop Event Times     Date Time Event    10/15/2021 0714 Ready for Procedure     0733 Anesthesia Start     0823 Anesthesia Stop        Responsible Staff  10/15/21    Name Role Begin End    Juanito Pisano M.D. Anesth 0733 0823        Preop Diagnosis (Free Text):  Pre-op Diagnosis     PILONIDAL CYST        Preop Diagnosis (Codes):    Premium Reason  Non-Premium    Comments:

## 2021-12-22 ENCOUNTER — HOSPITAL ENCOUNTER (OUTPATIENT)
Facility: MEDICAL CENTER | Age: 36
End: 2021-12-22
Attending: PHYSICIAN ASSISTANT
Payer: COMMERCIAL

## 2021-12-22 ENCOUNTER — OFFICE VISIT (OUTPATIENT)
Dept: URGENT CARE | Facility: CLINIC | Age: 36
End: 2021-12-22
Payer: COMMERCIAL

## 2021-12-22 VITALS
TEMPERATURE: 97.6 F | OXYGEN SATURATION: 96 % | WEIGHT: 236.6 LBS | RESPIRATION RATE: 14 BRPM | SYSTOLIC BLOOD PRESSURE: 110 MMHG | DIASTOLIC BLOOD PRESSURE: 66 MMHG | BODY MASS INDEX: 32.05 KG/M2 | HEART RATE: 70 BPM | HEIGHT: 72 IN

## 2021-12-22 DIAGNOSIS — Z20.822 SUSPECTED COVID-19 VIRUS INFECTION: ICD-10-CM

## 2021-12-22 DIAGNOSIS — R50.9 FEVER, UNSPECIFIED FEVER CAUSE: ICD-10-CM

## 2021-12-22 PROCEDURE — 0240U HCHG SARS-COV-2 COVID-19 NFCT DS RESP RNA 3 TRGT MIC: CPT

## 2021-12-22 PROCEDURE — 99213 OFFICE O/P EST LOW 20 MIN: CPT | Mod: CS | Performed by: PHYSICIAN ASSISTANT

## 2021-12-22 ASSESSMENT — ENCOUNTER SYMPTOMS
WHEEZING: 0
HEADACHES: 1
SORE THROAT: 1
FEVER: 1
MYALGIAS: 1
DIARRHEA: 1
EYE REDNESS: 0
VOMITING: 0
SHORTNESS OF BREATH: 0
COUGH: 0
EYE DISCHARGE: 0

## 2021-12-22 ASSESSMENT — FIBROSIS 4 INDEX: FIB4 SCORE: 0.87

## 2021-12-23 NOTE — PROGRESS NOTES
Subjective     Tereso Rosen is a 36 y.o. male who presents with Fever (Started on monday. ), Body Aches, and Nasal Congestion            Patient is a 36-year-old male who presents to urgent care with fevers for the last 2 days, body aches, congestion.  Patient reports symptoms began 3 days ago with an itchy throat and general malaise and the following day was significantly worse.  Patient does report fevers were upwards of 103 last night however patient is feeling significantly better today.  Has been taking DayQuil and NyQuil with mild improvement of symptoms none today.  He denies any ill exposures that he is aware of.  He is not vaccinated to COVID-19.  Denies any cough, shortness of breath, vomiting or diarrhea.  He did have a headache on day 1 however this is since resolved.    Fever   This is a new problem. The current episode started in the past 7 days. The problem occurs constantly. The problem has been resolved. The maximum temperature noted was 103 to 103.9 F. Associated symptoms include congestion, diarrhea, headaches, muscle aches and a sore throat. Pertinent negatives include no coughing, vomiting or wheezing. Treatments tried: As above.        Review of Systems   Constitutional: Positive for fever and malaise/fatigue.   HENT: Positive for congestion and sore throat.    Eyes: Negative for discharge and redness.   Respiratory: Negative for cough, shortness of breath and wheezing.    Gastrointestinal: Positive for diarrhea. Negative for vomiting.   Musculoskeletal: Positive for myalgias.   Neurological: Positive for headaches.   All other systems reviewed and are negative.             Objective     /66 (BP Location: Left arm, Patient Position: Sitting, BP Cuff Size: Adult)   Pulse 70   Temp 36.4 °C (97.6 °F) (Temporal)   Resp 14   Ht 1.829 m (6')   Wt 107 kg (236 lb 9.6 oz)   SpO2 96%   BMI 32.09 kg/m²    PMH:  has a past medical history of Anxiety, Bronchitis, and Psychiatric  problem. He also has no past medical history of Allergy, ASTHMA, or Diabetes.  MEDS: Reviewed .   ALLERGIES:   Allergies   Allergen Reactions   • Clindamycin Base Nausea     SURGHX:   Past Surgical History:   Procedure Laterality Date   • PILONIDAL CYST EXCISION  10/15/2021    Procedure: EXCISION, PILONIDAL CYST;  Surgeon: Kvng Banks M.D.;  Location: SURGERY [facility];  Service: Gastroenterology     SOCHX:  reports that he has been smoking. He has been smoking about 0.25 packs per day. His smokeless tobacco use includes chew. He reports current alcohol use of about 1.8 oz of alcohol per week. He reports that he does not use drugs.  FH: Family history was reviewed, no pertinent findings to report    Physical Exam  Vitals reviewed.   Constitutional:       General: He is not in acute distress.     Appearance: He is well-developed.   HENT:      Head: Normocephalic and atraumatic.      Right Ear: External ear normal.      Left Ear: External ear normal.   Eyes:      Conjunctiva/sclera: Conjunctivae normal.      Pupils: Pupils are equal, round, and reactive to light.   Neck:      Trachea: No tracheal deviation.   Cardiovascular:      Rate and Rhythm: Normal rate.   Pulmonary:      Effort: Pulmonary effort is normal.   Musculoskeletal:         General: Normal range of motion.      Cervical back: Normal range of motion and neck supple.   Skin:     General: Skin is warm.      Findings: No rash.      Comments: No rash to area exposed during the visit today.    Neurological:      Mental Status: He is alert and oriented to person, place, and time.      Coordination: Coordination normal.   Psychiatric:         Behavior: Behavior normal.         Thought Content: Thought content normal.         Judgment: Judgment normal.                             Assessment & Plan        1. Suspected COVID-19 virus infection  - CoV-2 and Flu A/B by PCR ([place]); Future    2. Fever, unspecified fever cause              Appropriate PPE  worn at all times by provider.   Pt. Had face mask on throughout entirety of the visit other than oropharyngeal examination today.     Testing performed for COVID-19.  Outside the window for [employer] utilization- will send off PCR.   Patient currently without indication of need for higher level of care.    Reviewed with patient/guardian that if they do test positive they will be contacted by their local health department regarding return to work/school protocols.  Results will also be released to patient/guardian in MyChart or called to the patient/guardian directly. Discussed isolation/quarantine recommendations.  Encouraged mask use, frequent handwashing, wiping down hard surfaces, etc.    Patient and/or guardian given precautionary s/sx that mandate immediate follow up and evaluation in the ED. Advised of risks of not doing so.  Side effects of OTC or prescribed medications discussed.   DDX, Supportive care, and indications for immediate follow-up discussed.  Instructed to return to clinic or nearest emergency department if we are not available for any change in condition, further concerns, or worsening of symptoms.    The patient and/or guardian demonstrated a good understanding and agreed with the treatment plan.    Please note that this dictation was created using voice recognition software. I have made every reasonable attempt to correct obvious errors, but I expect that there are errors of grammar and possibly content that I did not discover before finalizing the note.

## 2021-12-24 ENCOUNTER — PATIENT MESSAGE (OUTPATIENT)
Dept: URGENT CARE | Facility: CLINIC | Age: 36
End: 2021-12-24

## 2021-12-24 LAB
FLUAV RNA SPEC QL NAA+PROBE: NEGATIVE
FLUBV RNA SPEC QL NAA+PROBE: NEGATIVE
SARS-COV-2 RNA RESP QL NAA+PROBE: DETECTED
SPECIMEN SOURCE: ABNORMAL

## 2021-12-24 NOTE — TELEPHONE ENCOUNTER
From: Tereso Rosen  To: Physician Assistant Akhil Murray  Sent: 12/24/2021 12:25 PM PST  Subject: my covid test    when should i expect my covid test results? I dont even see that a test was ordered on [facility]

## 2022-04-07 ENCOUNTER — TELEMEDICINE (OUTPATIENT)
Dept: BEHAVIORAL HEALTH | Facility: CLINIC | Age: 37
End: 2022-04-07
Payer: COMMERCIAL

## 2022-04-07 DIAGNOSIS — F43.10 POSTTRAUMATIC STRESS DISORDER: ICD-10-CM

## 2022-04-07 DIAGNOSIS — F40.00 AGORAPHOBIA: ICD-10-CM

## 2022-04-07 PROCEDURE — 99214 OFFICE O/P EST MOD 30 MIN: CPT | Mod: 95 | Performed by: PSYCHIATRY & NEUROLOGY

## 2022-04-07 RX ORDER — SERTRALINE HYDROCHLORIDE 25 MG/1
TABLET, FILM COATED ORAL
Qty: 45 TABLET | Refills: 0 | Status: SHIPPED | OUTPATIENT
Start: 2022-04-07 | End: 2022-05-07

## 2022-04-07 ASSESSMENT — PATIENT HEALTH QUESTIONNAIRE - PHQ9: CLINICAL INTERPRETATION OF PHQ2 SCORE: 0
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
## 2022-11-21 ENCOUNTER — HOSPITAL ENCOUNTER (OUTPATIENT)
Dept: RADIOLOGY | Facility: MEDICAL CENTER | Age: 37
End: 2022-11-21
Attending: NURSE PRACTITIONER
Payer: COMMERCIAL

## 2022-11-21 ENCOUNTER — OFFICE VISIT (OUTPATIENT)
Dept: MEDICAL GROUP | Facility: MEDICAL CENTER | Age: 37
End: 2022-11-21
Payer: COMMERCIAL

## 2022-11-21 VITALS
BODY MASS INDEX: 33.59 KG/M2 | OXYGEN SATURATION: 98 % | HEIGHT: 72 IN | DIASTOLIC BLOOD PRESSURE: 80 MMHG | WEIGHT: 248 LBS | TEMPERATURE: 98.7 F | SYSTOLIC BLOOD PRESSURE: 112 MMHG | HEART RATE: 76 BPM

## 2022-11-21 DIAGNOSIS — E66.9 OBESITY (BMI 30-39.9): ICD-10-CM

## 2022-11-21 DIAGNOSIS — M79.645 PAIN OF FINGER OF LEFT HAND: ICD-10-CM

## 2022-11-21 PROCEDURE — 99214 OFFICE O/P EST MOD 30 MIN: CPT | Performed by: NURSE PRACTITIONER

## 2022-11-21 PROCEDURE — 73140 X-RAY EXAM OF FINGER(S): CPT | Mod: LT

## 2022-11-21 RX ORDER — NAPROXEN 500 MG/1
500 TABLET ORAL 2 TIMES DAILY WITH MEALS
Qty: 28 TABLET | Refills: 0 | Status: SHIPPED | OUTPATIENT
Start: 2022-11-21

## 2022-11-21 ASSESSMENT — FIBROSIS 4 INDEX: FIB4 SCORE: 0.89

## 2022-11-21 NOTE — PROGRESS NOTES
Subjective:   Tereso Rosen is a 37 y.o. male here today for finger pain    Pain of finger of left hand  Ongoing for about 3 months with pain, stiffness, locking of first finger on left hand. Worse with more repetitive use. Will get swollen with pain. Feels a clicking when he straightens digit.     Has not tried and OTC medications for this.     Doesn't recall any trauma or injuries to that hand or finger as far as fractures.      Current medicines (including changes today)  Current Outpatient Medications   Medication Sig Dispense Refill    naproxen (NAPROSYN) 500 MG Tab Take 1 Tablet by mouth 2 times a day with meals. 28 Tablet 0    Ascorbic Acid (VITAMIN C PO) Take 1 Tablet by mouth every day.      Multiple Vitamins-Minerals (ZINC PO) Take 1 Tablet by mouth every day.       No current facility-administered medications for this visit.     He  has a past medical history of Anxiety, Bronchitis, and Psychiatric problem.    He has no past medical history of Allergy, ASTHMA, or Diabetes.    ROS  No chest pain, no shortness of breath, no abdominal pain  Positive ROS as per HPI.  All other systems reviewed and are negative.     Objective:     /80 (BP Location: Right arm, Patient Position: Sitting, BP Cuff Size: Adult)   Pulse 76   Temp 37.1 °C (98.7 °F) (Temporal)   Ht 1.829 m (6')   Wt 112 kg (248 lb)   SpO2 98%  Body mass index is 33.63 kg/m².     Physical Exam:  Constitutional: Alert, no distress.  Skin: Warm, dry, good turgor, no rashes in visible areas.  Eye: Equal, round and reactive, conjunctiva clear, lids normal.  ENMT: Lips without lesions, good dentition, oropharynx clear.  Neck: Trachea midline, no masses, no thyromegaly. No cervical or supraclavicular lymphadenopathy  Respiratory: Unlabored respiratory effort  Psych: Alert and oriented x3, normal affect and mood.  MSK: decreased flexion of left 1st digit. No joint swelling or deformity noted      Assessment and Plan:   The following  treatment plan was discussed    1. Pain of finger of left hand  Unstable  Likely overuse injury or OA  Check xray hand to rule out fracture or significant arthritis  Trial naproxen BID for up to 2 weeks  Change activities to limit overuse of finger  Exercise hand ball daily  Follow up with hand specialist if worsening or not improving  - DX-FINGER(S) 2+ LEFT; Future  - naproxen (NAPROSYN) 500 MG Tab; Take 1 Tablet by mouth 2 times a day with meals.  Dispense: 28 Tablet; Refill: 0  - Referral to Hand Surgery      Followup: Return if symptoms worsen or fail to improve.    The MA is performing the above orders under the direction of Dr. Rodrigues    Please note that this dictation was created using voice recognition software. I have made every reasonable attempt to correct obvious errors, but I expect that there are errors of grammar and possibly content that I did not discover before finalizing the note.

## 2022-11-21 NOTE — ASSESSMENT & PLAN NOTE
Ongoing for about 3 months with pain, stiffness, locking of first finger on left hand. Worse with more repetitive use. Will get swollen with pain. Feels a clicking when he straightens digit.     Has not tried and OTC medications for this.     Doesn't recall any trauma or injuries to that hand or finger as far as fractures.

## 2023-01-05 ENCOUNTER — OFFICE VISIT (OUTPATIENT)
Dept: DERMATOLOGY | Facility: IMAGING CENTER | Age: 38
End: 2023-01-05
Payer: COMMERCIAL

## 2023-01-05 DIAGNOSIS — D48.9 NEOPLASM OF UNCERTAIN BEHAVIOR: ICD-10-CM

## 2023-01-05 DIAGNOSIS — B07.9 VERRUCAS: ICD-10-CM

## 2023-01-05 PROCEDURE — 17110 DESTRUCTION B9 LES UP TO 14: CPT | Performed by: NURSE PRACTITIONER

## 2023-01-05 PROCEDURE — 11102 TANGNTL BX SKIN SINGLE LES: CPT | Mod: 59 | Performed by: NURSE PRACTITIONER

## 2023-01-06 NOTE — PROGRESS NOTES
DERMATOLOGY NOTE  FOLLOW UP VISIT       Chief complaint: Follow-Up and Skin Lesion (Lip and left hand)   Lesion left cheek , spot left palm  has staci frozen before          History   History of skin cancer: No  History of precancers/actinic keratoses: No  History of biopsies:Yes, Details: cyst removal on right cheek,  History of blistering/severe sunburns:No  Family history of skin cancer:Yes, Details: mother - unsure of type  Family history of atypical moles:No      Allergies   Allergen Reactions    Clindamycin Base Nausea        MEDICATIONS:  Medications relevant to specialty reviewed.     REVIEW OF SYSTEMS:   Positive for skin (see HPI)  Negative for fevers and chills       EXAM:  There were no vitals taken for this visit.  Constitutional: Well-developed, well-nourished, and in no distress.     A focused skin exam was performed including the affected areas of the face, L hand. Notable findings on exam today listed below and/or in assessment/plan.     Verruca volar aspect of L hand  3 mm pink papule L cheek    IMPRESSION / PLAN:    1. Verrucas  - Benign-appearing nature of lesions discussed during exam.   CRYOTHERAPY:  Risks (including, but not limited to: skin discoloration, redness, blister, blood blister, recurrence, need for further treatment, infection, scar) and benefits of cryotherapy discussed. Patient verbally agreed to proceed with treatment. 2 cryotherapy freeze thaw cycles of 10 seconds were applied to 1 lesion on L palm with cryac. Patient tolerated procedure well. Aftercare instructions given--no specific care needed unless irritated during healing process, can apply Vaseline with small band-aid if needed.  -advised in 1 week to start wearing wart removal pads daily for at least 4-6 weeks or until resolved  - Advised to continue to monitor for any return to clinic for new or concerning changes.      2. Neoplasm of uncertain behavior  Procedure Note   Procedure: Biopsy by shave technique  Location: L  cheek  Size: as noted in exam  Preoperative diagnosis:r/o atypia  Risks, benefits and alternatives of procedure discussed, verbal consent obtained for photo (see chart) and written informed consent obtained for procedure. Time out completed. Area of biopsy prepped with alcohol. Anesthesia with 1% lidocaine with epinephrine administered with 30 gauge needle. Shave biopsy of the site performed. Hemostasis achieved with pressure and aluminum chloride. Vaseline applied to wound with bandage. Patient tolerated procedure well and there were no complications. The specimen was sent to the pathology lab by the staff. Wound care was discussed.        Discussed risks associated with LN2 and shave Bx Patient verbalized understanding and agrees with plan regarding the above            Please note that this dictation was created using voice recognition software. I have made every reasonable attempt to correct obvious errors, but I expect that there are errors of grammar and possibly content that I did not discover before finalizing the note.      Return to clinic in: Return for pending Bx results. and as needed for any new or changing skin lesions.

## 2023-02-13 ENCOUNTER — OFFICE VISIT (OUTPATIENT)
Dept: DERMATOLOGY | Facility: IMAGING CENTER | Age: 38
End: 2023-02-13
Payer: COMMERCIAL

## 2023-02-13 DIAGNOSIS — B07.9 VERRUCAS: ICD-10-CM

## 2023-02-13 PROCEDURE — 17110 DESTRUCTION B9 LES UP TO 14: CPT | Performed by: NURSE PRACTITIONER

## 2023-02-14 NOTE — PROGRESS NOTES
DERMATOLOGY NOTE  FOLLOW UP VISIT       Chief complaint: Follow-Up (Left palm)  Left Palm Verruca not improving with Cryotherapy Tx      History   History of skin cancer: No  History of precancers/actinic keratoses: No  History of biopsies:Yes, Details: cyst removal on right cheek,  History of blistering/severe sunburns:No  Family history of skin cancer:Yes, Details: mother - unsure of type  Family history of atypical moles:No      Allergies   Allergen Reactions    Clindamycin Base Nausea        MEDICATIONS:  Medications relevant to specialty reviewed.     REVIEW OF SYSTEMS:   Positive for skin (see HPI)  Negative for fevers and chills       EXAM:  There were no vitals taken for this visit.  Constitutional: Well-developed, well-nourished, and in no distress.     A focused skin exam was performed including the affected areas of the face, L hand. Notable findings on exam today listed below and/or in assessment/plan.     Verruca volar aspect of L hand--minimal improvement      IMPRESSION / PLAN:    1. Verrucas  - will repeat tx, discussed other tx options  CRYOTHERAPY:  Risks (including, but not limited to: skin discoloration, redness, blister, blood blister, recurrence, need for further treatment, infection, scar) and benefits of cryotherapy discussed. Patient verbally agreed to proceed with treatment. 2 cryotherapy freeze thaw cycles of 10 seconds were applied to 1 lesion on L palm with cryac. Patient tolerated procedure well. Aftercare instructions given--no specific care needed unless irritated during healing process, can apply Vaseline with small band-aid if needed.  -advised in 1 week to start wearing wart removal pads daily for at least 4-6 weeks or until resolved  - Advised to continue to monitor for any return to clinic for new or concerning changes.          Discussed risks associated with LN2 and shave Bx Patient verbalized understanding and agrees with plan regarding the above            Please note that  this dictation was created using voice recognition software. I have made every reasonable attempt to correct obvious errors, but I expect that there are errors of grammar and possibly content that I did not discover before finalizing the note.      Return to clinic in: Return for PRN. and as needed for any new or changing skin lesions.

## 2023-06-08 ENCOUNTER — OFFICE VISIT (OUTPATIENT)
Dept: DERMATOLOGY | Facility: IMAGING CENTER | Age: 38
End: 2023-06-08
Payer: COMMERCIAL

## 2023-06-08 DIAGNOSIS — B07.9 VERRUCAS: ICD-10-CM

## 2023-06-08 PROCEDURE — 17110 DESTRUCTION B9 LES UP TO 14: CPT | Performed by: NURSE PRACTITIONER

## 2023-06-08 NOTE — PROGRESS NOTES
DERMATOLOGY NOTE  FOLLOW UP VISIT       Chief complaint: Follow-Up  Left Palm Verruca not improving with Cryotherapy Tx      History   History of skin cancer: No  History of precancers/actinic keratoses: No  History of biopsies:Yes, Details: cyst removal on right cheek,  History of blistering/severe sunburns:No  Family history of skin cancer:Yes, Details: mother - unsure of type  Family history of atypical moles:No      Allergies   Allergen Reactions    Clindamycin Base Nausea        MEDICATIONS:  Medications relevant to specialty reviewed.     REVIEW OF SYSTEMS:   Positive for skin (see HPI)  Negative for fevers and chills       EXAM:  There were no vitals taken for this visit.  Constitutional: Well-developed, well-nourished, and in no distress.     A focused skin exam was performed including the affected areas of the face, L hand. Notable findings on exam today listed below and/or in assessment/plan.     Verruca volar aspect of L hand--minimal improvement      IMPRESSION / PLAN:    1. Verrucas  Pt here for further tx for verruca on volar aspect of L hand, not improved with LN2  Pt brings in Cantharidin solution from compound pharmacy  Verbal consent given, lesion pared down with 10 blade  Cantharidin applied, topped with bandaid  Aftercare instructions given  Follow up 4-6 weeks if further tx needed      Discussed risks associated with Cantharadin, Patient verbalized understanding and agrees with plan regarding the above            Please note that this dictation was created using voice recognition software. I have made every reasonable attempt to correct obvious errors, but I expect that there are errors of grammar and possibly content that I did not discover before finalizing the note.      Return to clinic in: Return for 4-6 week if further tx needed. and as needed for any new or changing skin lesions.

## 2024-01-21 ENCOUNTER — HOSPITAL ENCOUNTER (EMERGENCY)
Facility: MEDICAL CENTER | Age: 39
End: 2024-01-21
Attending: EMERGENCY MEDICINE
Payer: COMMERCIAL

## 2024-01-21 VITALS
OXYGEN SATURATION: 99 % | RESPIRATION RATE: 18 BRPM | HEART RATE: 83 BPM | HEIGHT: 72 IN | DIASTOLIC BLOOD PRESSURE: 85 MMHG | WEIGHT: 213.19 LBS | BODY MASS INDEX: 28.88 KG/M2 | TEMPERATURE: 97.8 F | SYSTOLIC BLOOD PRESSURE: 149 MMHG

## 2024-01-21 DIAGNOSIS — L03.818 CELLULITIS OF OTHER SPECIFIED SITE: ICD-10-CM

## 2024-01-21 DIAGNOSIS — L05.01 PILONIDAL CYST WITH ABSCESS: ICD-10-CM

## 2024-01-21 DIAGNOSIS — R50.9 FEVER, UNSPECIFIED FEVER CAUSE: ICD-10-CM

## 2024-01-21 PROCEDURE — 99282 EMERGENCY DEPT VISIT SF MDM: CPT

## 2024-01-21 RX ORDER — CEPHALEXIN 500 MG/1
500 CAPSULE ORAL 4 TIMES DAILY
Qty: 28 CAPSULE | Refills: 0 | Status: ACTIVE | OUTPATIENT
Start: 2024-01-21 | End: 2024-01-28

## 2024-01-21 RX ORDER — SULFAMETHOXAZOLE AND TRIMETHOPRIM 800; 160 MG/1; MG/1
1 TABLET ORAL 2 TIMES DAILY
Qty: 14 TABLET | Refills: 0 | Status: ACTIVE | OUTPATIENT
Start: 2024-01-21 | End: 2024-01-28

## 2024-01-21 NOTE — ED NOTES
ERP at bedside. Pt agrees with plan of care discussed by ERP. AIDET acknowledged with patient. April in low position, side rail up for pt safety. Call light within reach. Will continue to monitor.

## 2024-01-21 NOTE — ED PROVIDER NOTES
ED Provider Note    CHIEF COMPLAINT  Chief Complaint   Patient presents with    Rectal Pain     Onset Fri Hx pilonidal cyst with surgery in past  Cyst ruptured this am  pus drainge      Fever     T-max 102 F last night with diaph       EXTERNAL RECORDS REVIEWED  Inpatient Notes the patient had excision of pilonidal cyst, I reviewed the of note on October 15, 2021 done by Dr. Banks    HPI/ROS  LIMITATION TO HISTORY   Select: : None  OUTSIDE HISTORIAN(S):  None    Tereso Rosen is a 38 y.o. male who presents with past medical history significant for bronchitis and anxiety, he had a pilonidal cyst that required drainage previously, he then went to Dr. Banks to have surgery and this was October 15, 2021, he developed pain in the area of his pilonidal abscess and fever last night.  It drained and now he says he feels better and does not feel the mass and tenderness and pain that was present last night.    PAST MEDICAL HISTORY   has a past medical history of Anxiety, Bronchitis, and Psychiatric problem.    SURGICAL HISTORY   has a past surgical history that includes pilonidal cyst excision (10/15/2021).    FAMILY HISTORY  Family History   Problem Relation Age of Onset    Diabetes Father     No Known Problems Mother     Diabetes Sister        SOCIAL HISTORY  Social History     Tobacco Use    Smoking status: Some Days     Current packs/day: 0.25     Types: Cigarettes    Smokeless tobacco: Current     Types: Chew    Tobacco comments:     2-3 days per week, 1   Vaping Use    Vaping Use: Never used   Substance and Sexual Activity    Alcohol use: Yes     Alcohol/week: 1.8 oz     Types: 3 Shots of liquor per week     Comment: 3x per week    Drug use: No    Sexual activity: Never       CURRENT MEDICATIONS  Home Medications    **Home medications have not yet been reviewed for this encounter**         ALLERGIES  Allergies   Allergen Reactions    Clindamycin Base Nausea       PHYSICAL EXAM  VITAL SIGNS: BP (!) 149/85    Pulse 80   Temp 36.6 °C (97.8 °F) (Temporal)   Resp 18   Ht 1.829 m (6')   Wt 96.7 kg (213 lb 3 oz)   SpO2 98%   BMI 28.91 kg/m²        Buttocks: The patient has cellulitis of the area.  There is no fluctuance or area that needs to be drained currently.  It appears that he has spontaneously drained at home.  He does have associated cellulitis.        COURSE & MEDICAL DECISION MAKING    ED Observation Status? No; Patient does not meet criteria for ED Observation.     INITIAL ASSESSMENT, COURSE AND PLAN  Care Narrative:     The patient presents with a surgically repaired pilonidal abscess from October 2021 that now has reaccumulated pus and spontaneously drained at home.  He did have fever as well last night.  His symptoms have improved dramatically today.  Because he has both cellulitis and purulent drainage she will be placed on Bactrim and Keflex.  He would like a new surgeon and I was given him the phone number of the surgeon on-call.        ADDITIONAL PROBLEM LIST  Anxiety  DISPOSITION AND DISCUSSIONS  I have discussed management of the patient with the following physicians and YISEL's: None    Discussion of management with other QHP or appropriate source(s): None     Escalation of care considered, and ultimately not performed: Incision and drainage, however the patient already drained spontaneously at home last night    Barriers to care at this time, including but not limited to:  None .     Decision tools and prescription drugs considered including, but not limited to: Antibiotics Bactrim and Keflex because the patient has purulent drainage and cellulitis to cover staph and strep and MRSA .      The patient will return for new or worsening symptoms and is stable at the time of discharge.    The patient is referred to a primary physician for blood pressure management, diabetic screening, and for all other preventative health concerns.        DISPOSITION:  Patient will be discharged home in stable  condition.    FOLLOW UP:  [facility], Emergency Dept  [address]  [place] [address] [phone]    If symptoms worsen    Gela Stone A.P.R.N.  [address]  [address]  [facility] [address] [phone]      As needed    Juanito Gaston D.O.  [address] [facility]  [address]  [facility] [address] [phone]      Call this surgeon for follow-up      OUTPATIENT MEDICATIONS:  New Prescriptions    CEPHALEXIN (KEFLEX) 500 MG CAP    Take 1 Capsule by mouth 4 times a day for 7 days.    SULFAMETHOXAZOLE-TRIMETHOPRIM (BACTRIM DS) 800-160 MG TABLET    Take 1 Tablet by mouth 2 times a day for 7 days.         FINAL DIAGNOSIS  1. Cellulitis of other specified site    2. Pilonidal cyst with abscess    3. Fever, unspecified fever cause           Electronically signed by: Cullen Olsen M.D., 1/21/2024 10:39 AM

## 2025-04-15 ENCOUNTER — APPOINTMENT (OUTPATIENT)
Dept: ADMISSIONS | Facility: MEDICAL CENTER | Age: 40
End: 2025-04-15
Attending: SURGERY
Payer: COMMERCIAL

## 2025-04-18 ENCOUNTER — PRE-ADMISSION TESTING (OUTPATIENT)
Dept: ADMISSIONS | Facility: MEDICAL CENTER | Age: 40
End: 2025-04-18
Attending: SURGERY
Payer: COMMERCIAL

## 2025-04-18 VITALS — BODY MASS INDEX: 28.91 KG/M2 | HEIGHT: 72 IN

## 2025-04-18 RX ORDER — SERTRALINE HYDROCHLORIDE 100 MG/1
100 TABLET, FILM COATED ORAL EVERY MORNING
COMMUNITY
Start: 2024-02-20

## 2025-04-18 RX ORDER — BUSPIRONE HYDROCHLORIDE 15 MG/1
15 TABLET ORAL EVERY MORNING
COMMUNITY
Start: 2024-02-20

## 2025-04-18 NOTE — PREADMIT AVS NOTE
Current Medications   Medication Instructions    busPIRone (BUSPAR) 15 MG tablet Continue taking medication as prescribed, including morning of procedure     sertraline (ZOLOFT) 100 MG Tab Continue taking medication as prescribed, including morning of procedure

## 2025-05-14 ENCOUNTER — ANESTHESIA EVENT (OUTPATIENT)
Dept: SURGERY | Facility: MEDICAL CENTER | Age: 40
End: 2025-05-14
Payer: COMMERCIAL

## 2025-05-14 ENCOUNTER — HOSPITAL ENCOUNTER (OUTPATIENT)
Facility: MEDICAL CENTER | Age: 40
End: 2025-05-14
Attending: SURGERY | Admitting: SURGERY
Payer: COMMERCIAL

## 2025-05-14 ENCOUNTER — ANESTHESIA (OUTPATIENT)
Dept: SURGERY | Facility: MEDICAL CENTER | Age: 40
End: 2025-05-14
Payer: COMMERCIAL

## 2025-05-14 VITALS
OXYGEN SATURATION: 95 % | SYSTOLIC BLOOD PRESSURE: 119 MMHG | BODY MASS INDEX: 34.13 KG/M2 | HEIGHT: 72 IN | TEMPERATURE: 97.3 F | DIASTOLIC BLOOD PRESSURE: 73 MMHG | WEIGHT: 251.99 LBS | RESPIRATION RATE: 18 BRPM | HEART RATE: 69 BPM

## 2025-05-14 DIAGNOSIS — G89.18 POST-OPERATIVE PAIN: Primary | ICD-10-CM

## 2025-05-14 DIAGNOSIS — L05.91 PILONIDAL CYST WITHOUT ABSCESS: ICD-10-CM

## 2025-05-14 PROCEDURE — 160035 HCHG PACU - 1ST 60 MINS PHASE I: Performed by: SURGERY

## 2025-05-14 PROCEDURE — 700102 HCHG RX REV CODE 250 W/ 637 OVERRIDE(OP): Performed by: ANESTHESIOLOGY

## 2025-05-14 PROCEDURE — 700101 HCHG RX REV CODE 250: Performed by: ANESTHESIOLOGY

## 2025-05-14 PROCEDURE — 160025 RECOVERY II MINUTES (STATS): Performed by: SURGERY

## 2025-05-14 PROCEDURE — 160046 HCHG PACU - 1ST 60 MINS PHASE II: Performed by: SURGERY

## 2025-05-14 PROCEDURE — A9270 NON-COVERED ITEM OR SERVICE: HCPCS | Performed by: ANESTHESIOLOGY

## 2025-05-14 PROCEDURE — 160048 HCHG OR STATISTICAL LEVEL 1-5: Performed by: SURGERY

## 2025-05-14 PROCEDURE — 160002 HCHG RECOVERY MINUTES (STAT): Performed by: SURGERY

## 2025-05-14 PROCEDURE — 700111 HCHG RX REV CODE 636 W/ 250 OVERRIDE (IP): Performed by: SURGERY

## 2025-05-14 PROCEDURE — 160042 HCHG SURGERY MINUTES - EA ADDL 1 MIN LEVEL 5: Performed by: SURGERY

## 2025-05-14 PROCEDURE — 88304 TISSUE EXAM BY PATHOLOGIST: CPT | Performed by: PATHOLOGY

## 2025-05-14 PROCEDURE — 160009 HCHG ANES TIME/MIN: Performed by: SURGERY

## 2025-05-14 PROCEDURE — 88304 TISSUE EXAM BY PATHOLOGIST: CPT | Mod: 26 | Performed by: PATHOLOGY

## 2025-05-14 PROCEDURE — 700111 HCHG RX REV CODE 636 W/ 250 OVERRIDE (IP): Mod: JZ | Performed by: ANESTHESIOLOGY

## 2025-05-14 PROCEDURE — 700105 HCHG RX REV CODE 258: Performed by: SURGERY

## 2025-05-14 PROCEDURE — 700101 HCHG RX REV CODE 250: Performed by: SURGERY

## 2025-05-14 PROCEDURE — 160031 HCHG SURGERY MINUTES - 1ST 30 MINS LEVEL 5: Performed by: SURGERY

## 2025-05-14 PROCEDURE — 160015 HCHG STAT PREOP MINUTES: Performed by: SURGERY

## 2025-05-14 PROCEDURE — 160036 HCHG PACU - EA ADDL 30 MINS PHASE I: Performed by: SURGERY

## 2025-05-14 PROCEDURE — 700102 HCHG RX REV CODE 250 W/ 637 OVERRIDE(OP): Performed by: SURGERY

## 2025-05-14 PROCEDURE — A9270 NON-COVERED ITEM OR SERVICE: HCPCS | Performed by: SURGERY

## 2025-05-14 RX ORDER — DIPHENHYDRAMINE HYDROCHLORIDE 50 MG/ML
12.5 INJECTION, SOLUTION INTRAMUSCULAR; INTRAVENOUS
Status: DISCONTINUED | OUTPATIENT
Start: 2025-05-14 | End: 2025-05-14 | Stop reason: HOSPADM

## 2025-05-14 RX ORDER — BUPIVACAINE HYDROCHLORIDE AND EPINEPHRINE 5; 5 MG/ML; UG/ML
INJECTION, SOLUTION EPIDURAL; INTRACAUDAL; PERINEURAL
Status: DISCONTINUED | OUTPATIENT
Start: 2025-05-14 | End: 2025-05-14 | Stop reason: HOSPADM

## 2025-05-14 RX ORDER — ONDANSETRON 2 MG/ML
4 INJECTION INTRAMUSCULAR; INTRAVENOUS
Status: DISCONTINUED | OUTPATIENT
Start: 2025-05-14 | End: 2025-05-14 | Stop reason: HOSPADM

## 2025-05-14 RX ORDER — CEFAZOLIN SODIUM 1 G/3ML
INJECTION, POWDER, FOR SOLUTION INTRAMUSCULAR; INTRAVENOUS PRN
Status: DISCONTINUED | OUTPATIENT
Start: 2025-05-14 | End: 2025-05-14 | Stop reason: SURG

## 2025-05-14 RX ORDER — CEPHALEXIN 500 MG/1
500 CAPSULE ORAL 4 TIMES DAILY
Qty: 28 CAPSULE | Refills: 0 | Status: SHIPPED | OUTPATIENT
Start: 2025-05-14 | End: 2025-05-21

## 2025-05-14 RX ORDER — DEXAMETHASONE SODIUM PHOSPHATE 4 MG/ML
INJECTION, SOLUTION INTRA-ARTICULAR; INTRALESIONAL; INTRAMUSCULAR; INTRAVENOUS; SOFT TISSUE PRN
Status: DISCONTINUED | OUTPATIENT
Start: 2025-05-14 | End: 2025-05-14 | Stop reason: SURG

## 2025-05-14 RX ORDER — LIDOCAINE HYDROCHLORIDE 20 MG/ML
INJECTION, SOLUTION EPIDURAL; INFILTRATION; INTRACAUDAL; PERINEURAL PRN
Status: DISCONTINUED | OUTPATIENT
Start: 2025-05-14 | End: 2025-05-14 | Stop reason: SURG

## 2025-05-14 RX ORDER — KETOROLAC TROMETHAMINE 15 MG/ML
INJECTION, SOLUTION INTRAMUSCULAR; INTRAVENOUS PRN
Status: DISCONTINUED | OUTPATIENT
Start: 2025-05-14 | End: 2025-05-14 | Stop reason: SURG

## 2025-05-14 RX ORDER — OXYCODONE HCL 5 MG/5 ML
5 SOLUTION, ORAL ORAL
Status: COMPLETED | OUTPATIENT
Start: 2025-05-14 | End: 2025-05-14

## 2025-05-14 RX ORDER — SODIUM CHLORIDE, SODIUM LACTATE, POTASSIUM CHLORIDE, CALCIUM CHLORIDE 600; 310; 30; 20 MG/100ML; MG/100ML; MG/100ML; MG/100ML
INJECTION, SOLUTION INTRAVENOUS CONTINUOUS
Status: ACTIVE | OUTPATIENT
Start: 2025-05-14 | End: 2025-05-14

## 2025-05-14 RX ORDER — ONDANSETRON 2 MG/ML
INJECTION INTRAMUSCULAR; INTRAVENOUS PRN
Status: DISCONTINUED | OUTPATIENT
Start: 2025-05-14 | End: 2025-05-14 | Stop reason: SURG

## 2025-05-14 RX ORDER — OXYCODONE HCL 5 MG/5 ML
10 SOLUTION, ORAL ORAL
Status: COMPLETED | OUTPATIENT
Start: 2025-05-14 | End: 2025-05-14

## 2025-05-14 RX ORDER — HALOPERIDOL 5 MG/ML
1 INJECTION INTRAMUSCULAR
Status: DISCONTINUED | OUTPATIENT
Start: 2025-05-14 | End: 2025-05-14 | Stop reason: HOSPADM

## 2025-05-14 RX ORDER — OXYCODONE HYDROCHLORIDE 5 MG/1
5 TABLET ORAL EVERY 6 HOURS PRN
Qty: 28 TABLET | Refills: 0 | Status: SHIPPED | OUTPATIENT
Start: 2025-05-14 | End: 2025-05-21

## 2025-05-14 RX ORDER — PETROLATUM 420 MG/G
OINTMENT TOPICAL
Status: DISCONTINUED | OUTPATIENT
Start: 2025-05-14 | End: 2025-05-14 | Stop reason: HOSPADM

## 2025-05-14 RX ORDER — SODIUM CHLORIDE, SODIUM LACTATE, POTASSIUM CHLORIDE, CALCIUM CHLORIDE 600; 310; 30; 20 MG/100ML; MG/100ML; MG/100ML; MG/100ML
INJECTION, SOLUTION INTRAVENOUS CONTINUOUS
Status: DISCONTINUED | OUTPATIENT
Start: 2025-05-14 | End: 2025-05-14 | Stop reason: HOSPADM

## 2025-05-14 RX ADMIN — SODIUM CHLORIDE, POTASSIUM CHLORIDE, SODIUM LACTATE AND CALCIUM CHLORIDE: 600; 310; 30; 20 INJECTION, SOLUTION INTRAVENOUS at 13:27

## 2025-05-14 RX ADMIN — SUGAMMADEX 200 MG: 100 INJECTION, SOLUTION INTRAVENOUS at 15:03

## 2025-05-14 RX ADMIN — Medication 100 MG: at 13:33

## 2025-05-14 RX ADMIN — DEXAMETHASONE SODIUM PHOSPHATE 4 MG: 4 INJECTION INTRA-ARTICULAR; INTRALESIONAL; INTRAMUSCULAR; INTRAVENOUS; SOFT TISSUE at 13:33

## 2025-05-14 RX ADMIN — PROPOFOL 200 MG: 10 INJECTION, EMULSION INTRAVENOUS at 13:33

## 2025-05-14 RX ADMIN — ONDANSETRON 4 MG: 2 INJECTION INTRAMUSCULAR; INTRAVENOUS at 15:03

## 2025-05-14 RX ADMIN — LIDOCAINE HYDROCHLORIDE 100 MG: 20 INJECTION, SOLUTION EPIDURAL; INFILTRATION; INTRACAUDAL; PERINEURAL at 13:33

## 2025-05-14 RX ADMIN — ROCURONIUM BROMIDE 50 MG: 10 INJECTION INTRAVENOUS at 13:33

## 2025-05-14 RX ADMIN — FENTANYL CITRATE 100 MCG: 50 INJECTION, SOLUTION INTRAMUSCULAR; INTRAVENOUS at 13:33

## 2025-05-14 RX ADMIN — CEFAZOLIN 2 G: 1 INJECTION, POWDER, FOR SOLUTION INTRAMUSCULAR; INTRAVENOUS at 13:42

## 2025-05-14 RX ADMIN — KETOROLAC TROMETHAMINE 15 MG: 15 INJECTION, SOLUTION INTRAMUSCULAR; INTRAVENOUS at 15:03

## 2025-05-14 RX ADMIN — OXYCODONE HYDROCHLORIDE 5 MG: 5 SOLUTION ORAL at 15:32

## 2025-05-14 ASSESSMENT — PAIN DESCRIPTION - PAIN TYPE
TYPE: SURGICAL PAIN

## 2025-05-14 NOTE — ANESTHESIA PREPROCEDURE EVALUATION
Case: [id number] Date/Time: 05/14/25 1315    Procedures:       EXCISION OF RECURRENT PILONIDAL SINUS WITH FLAP CLOSURE (Coccyx)      FLAP GRAFT (Coccyx)    Anesthesia type: General    Pre-op diagnosis: PILONIDAL CYST    Location: [facility] OR 01 / SURGERY [facility]    Surgeons: Margarito Gutierrez M.D.            Relevant Problems   No relevant active problems       Physical Exam    Airway   Mallampati: II  TM distance: >3 FB  Neck ROM: full       Cardiovascular - normal exam  Rhythm: regular  Rate: normal    (-) murmur     Dental - normal exam           Pulmonary - normal examBreath sounds clear to auscultation     Abdominal    Neurological - normal exam                   Anesthesia Plan    ASA 2       Plan - general       Airway plan will be ETT          Induction: intravenous    Postoperative Plan: Postoperative administration of opioids is intended.    Pertinent diagnostic labs and testing reviewed    Informed Consent:    Anesthetic plan and risks discussed with patient.    Use of blood products discussed with: patient whom consented to blood products.

## 2025-05-14 NOTE — DISCHARGE INSTRUCTIONS
1.  No direct pressure to flap  2.  Vaseline to incisions daily  3.  Okay to shower tomorrow, do not bathe or swim  4.  Call with redness, drainage or increase in pain    What to Expect Post Anesthesia    Rest and take it easy for the first 24 hours.  A responsible adult is recommended to remain with you during that time.  It is normal to feel sleepy.  We encourage you to not do anything that requires balance, judgment or coordination.    FOR 24 HOURS DO NOT:  Drive, operate machinery or run household appliances.  Drink beer or alcoholic beverages.  Make important decisions or sign legal documents.    To avoid nausea, slowly advance diet as tolerated, avoiding spicy or greasy foods for the first day.  Add more substantial food to your diet according to your provider's instructions.  Babies can be fed formula or breast milk as soon as they are hungry.  INCREASE FLUIDS AND FIBER TO AVOID CONSTIPATION.    MILD FLU-LIKE SYMPTOMS ARE NORMAL.  YOU MAY EXPERIENCE GENERALIZED MUSCLE ACHES, THROAT IRRITATION, HEADACHE AND/OR SOME NAUSEA.    If any questions arise, call your provider.  If your provider is not available, please feel free to call the Surgical Center at [phone]  .    MEDICATIONS: Resume taking daily medication.  Take prescribed pain medication with food.  If no medication is prescribed, you may take non-aspirin pain medication if needed.  PAIN MEDICATION CAN BE VERY CONSTIPATING.  Take a stool softener or laxative such as senokot, pericolace, or milk of magnesia if needed.    Last pain medication Oxycodone 5 mg given at 1532    Diet    Resume pre-operative diet upon discharge from the hospital. Depending on how you are feeling and whether you have nausea or not, you may wish to stay with a bland diet for the first few days. However, you can advance your diet as quickly as you feel ready.

## 2025-05-14 NOTE — OR NURSING
1453- Patient arrived to PACU with oral airway in place and 6L oxygen mask. Received report from anesthesia and OR nurse. Surgical dressing and sutures to buttock CDI. Capillary refill less than 3 seconds.     1508- Oral airway discontinued.     1520- Patient denies nausea and pain at this time.     1532- Reporting 4/10 pain. Medicated per MAR.     1540- Patient's fiance Iveth updated via phone call.  Patient tolerating small sips of water at this time.     1610- Education provided on incentive spirometer. Patient verbalized understanding and able to pull 3500 on IS.     1635- Verified with pharmacy that patient's prescribed medications were sent over to patient's home pharmacy earlier today.     1640- Patient up to recliner with assistance. Scant serosanguinous drainage noted at surgical incision site.     1657- Report given to Rosa ACOSTA. All questions answered.     1710- Patient ambulated to Stage 2 Recovery area. Steady gait noted. Patient was escorted to stage 2 by RN. All personal belongings transferred with patient.

## 2025-05-14 NOTE — ANESTHESIA TIME REPORT
Anesthesia Start and Stop Event Times       Date Time Event    5/14/2025 1318 Ready for Procedure     1327 Anesthesia Start     1503 Anesthesia Stop          Responsible Staff  05/14/25      Name Role Begin End    Clayton Potter M.D. Anesth 1327 1503          Overtime Reason:  no overtime (within assigned shift)    Comments:

## 2025-05-14 NOTE — OR NURSING
Patient allergies and NPO status verified, home medication reconciliation completed, belongings secured. Patient verbalizes understanding of pain scale, expected course of stay and plan of care. Surgical site verified with patient, IV access established sequentials in place.  Pt awaiting surgery w/ call light available for staff assistance as needed.

## 2025-05-14 NOTE — OP REPORT
OP Note    PreOp Diagnosis:   1.  Recurrent pilonidal sinus    PostOp Diagnosis:   1.  Recurrent pilonidal sinus    Procedure(s):  1.  Excision of recurrent pilonidal sinus  2.  Fasciocutaneous flap from right gluteus (4 cm x 4 cm)    Wound Class: Contaminated    Surgeon(s):  Margarito Gutierrez M.D.    Assistant:  Ginny Palacios PA-C    Anesthesiologist/Type of Anesthesia:  Anesthesiologist: Clayton Potter M.D./General    Surgical Staff:  Circulator: Marixa Painting R.N.  Scrub Person: Hugh Ruelas; Debbie Cao  First Assist: Ginny Palacios P.A.-C.    Specimens removed if any:  ID Type Source Tests Collected by Time Destination   A : pilonidal cyst Tissue Cyst PATHOLOGY SPECIMEN Margarito Gutierrez M.D. 5/14/2025  2:09 PM        Estimated Blood Loss: 40 cc    Findings:   1.  Midline sinus and superior portion of prior curvilinear incision    Complications: None observed    Details of procedure:  The patient was met in the preoperative holding area where all the potential risks and benefits of the procedure were discussed in detail.  All of his questions were answered to his satisfaction.  Informed consent was signed and the patient was transported back to the operating room where general endotracheal anesthesia was induced and all the patient's pressure points were padded appropriately.  His bilateral buttocks and perineum were prepped and draped in usual sterile fashion and a timeout was performed which correctly identified the patient and the procedure being performed.  Preoperative antibiotics were administered according to SCIP protocol.  The operation was revealed inspection of the wounds.  There was no active draining sinus observed along the previous curvilinear incision.  There was friable prior scar and some apparent paucity of tissue beneath the superior portion of his prior incision.  Needle was placed in through this incision into the suspected location of the sinus.  No  purulent fluid could be aspirated.  Injected approximately 1 to 2 cc of methylene blue into the sinus cavity.  I then marked out a 4 cm rhomboid jim-shaped excisional pattern.  These incisions were then created with a #10 blade scalpel and carried through subcutaneous tissue using electrocautery.  Electrocautery was used to dissect down to the gluteal musculature.  The wound bed was then excised in its entirely after being removed from underlying sacral fascia.  The entirety of the wound was then amputated and sent off for pathologic analysis.  The wound bed was then irrigated and hemostasis meticulously achieved.  We then marked out a 4cm rhomboid flap from the right gluteus.  Incisions were made with scalpel and electrocautery was used to dissect down to the gluteal fascia.  Metzenbaum scissors were then used to dissect beneath and cut the underlying gluteal fascia to bring it over with the flap.  Once this was achieved I used 0 PDS sutures to secure the flap in place and close the donor site.  This elevated the flap and the position without any undue tension and rested very nicely inside the wound.  The flap was then inset with deep dermal 3-0 Vicryl pop sutures.  I used running 2-0 nylon suture to reapproximate the epidermis.  Vaseline was applied as well as sterile fluffs.  The patient tolerated the procedure well.  All the sponge needle and instrument counts were correct at the conclusion of the case.  After the patient was extubated he was transported back to the recovery room in stable condition.        5/14/2025 2:44 PM Margarito Gutierrez M.D.

## 2025-05-14 NOTE — ANESTHESIA POSTPROCEDURE EVALUATION
Patient: Tereso Rosen    Procedure Summary       Date: 05/14/25 Room / Location:  OR  / SURGERY [facility]    Anesthesia Start: 1327 Anesthesia Stop: 1503    Procedures:       EXCISION OF RECURRENT PILONIDAL SINUS WITH FLAP CLOSURE (Coccyx)      FLAP GRAFT (Coccyx) Diagnosis: (PILONIDAL CYST)    Surgeons: Margarito Gutierrez M.D. Responsible Provider: Clayton Potter M.D.    Anesthesia Type: general ASA Status: 2            Final Anesthesia Type: general  Last vitals  BP   Blood Pressure: 118/69    Temp   36.2 °C (97.2 °F)    Pulse   75   Resp   16    SpO2   95 %      Anesthesia Post Evaluation    Patient location during evaluation: PACU  Patient participation: complete - patient participated  Level of consciousness: awake and alert    Airway patency: patent  Anesthetic complications: no  Cardiovascular status: hemodynamically stable  Respiratory status: acceptable  Hydration status: euvolemic    PONV: none          No notable events documented.     Nurse Pain Score: 0 (NPRS)

## 2025-05-14 NOTE — ANESTHESIA PROCEDURE NOTES
Airway    Date/Time: 5/14/2025 1:41 PM    Performed by: Clayton Potter M.D.  Authorized by: Clayton Potter M.D.    Location:  OR  Urgency:  Elective  Indications for Airway Management:  Anesthesia      Spontaneous Ventilation: absent    Sedation Level:  Deep  Preoxygenated: Yes    Patient Position:  Sniffing  Final Airway Type:  Endotracheal airway  Final Endotracheal Airway:  ETT  Cuffed: Yes    Technique Used for Successful ETT Placement:  Direct laryngoscopy    Insertion Site:  Oral  Blade Type:  [place]  Laryngoscope Blade/Videolaryngoscope Blade Size:  3  ETT Size (mm):  7.5  Measured from:  Teeth  ETT to Teeth (cm):  20  Placement Verified by: auscultation and capnometry    Cormack-Lehane Classification:  Grade I - full view of glottis  Number of Attempts at Approach:  1

## 2025-05-15 LAB — PATHOLOGY CONSULT NOTE: NORMAL

## 2025-06-09 ENCOUNTER — PATIENT MESSAGE (OUTPATIENT)
Dept: DERMATOLOGY | Facility: IMAGING CENTER | Age: 40
End: 2025-06-09
Payer: COMMERCIAL

## (undated) DEVICE — KIT ANESTHESIA W/CIRCUIT & 3/LT BAG W/FILTER (20EA/CA)

## (undated) DEVICE — WATER IRRIGATION STERILE 1000ML (12EA/CA)

## (undated) DEVICE — SODIUM CHL IRRIGATION 0.9% 1000ML (12EA/CA)

## (undated) DEVICE — BRIEF STRETCH MATERNITY M/L - FITS 20-60IN (5EA/BG 20BG/CA)

## (undated) DEVICE — GLOVE BIOGEL SZ 7.5 SURGICAL PF LTX - (50PR/BX 4BX/CA)

## (undated) DEVICE — HEADREST ADULT HIGH PROFILE

## (undated) DEVICE — ELECTRODE DUAL RETURN W/ CORD - (50/PK)

## (undated) DEVICE — SUTURE 2-0 ETHILON FS - (36/BX) 18 INCH

## (undated) DEVICE — SET LEADWIRE 5 LEAD BEDSIDE DISPOSABLE ECG (1SET OF 5/EA)

## (undated) DEVICE — CANISTER SUCTION 3000ML MECHANICAL FILTER AUTO SHUTOFF MEDI-VAC NONSTERILE LF DISP  (40EA/CA)

## (undated) DEVICE — NEPTUNE 4 PORT MANIFOLD - (20/PK)

## (undated) DEVICE — PROTECTOR ULNA NERVE - (36PR/CA)

## (undated) DEVICE — TOWEL STOP TIMEOUT SAFETY FLAG (40EA/CA)

## (undated) DEVICE — HEADREST PRONEVIEW LARGE - (10/CA)

## (undated) DEVICE — HEAD HOLDER JUNIOR/ADULT

## (undated) DEVICE — GOWN WARMING STANDARD FLEX - (30/CA)

## (undated) DEVICE — SENSOR SPO2 NEO LNCS ADHESIVE (20/BX) SEE USER NOTES

## (undated) DEVICE — BLADE SURGICAL #15 - (50/BX 3BX/CA)

## (undated) DEVICE — CHLORAPREP 26 ML APPLICATOR - ORANGE TINT(25/CA)

## (undated) DEVICE — COVER LIGHT HANDLE FLEXIBLE - SOFT (2EA/PK 80PK/CA)

## (undated) DEVICE — SUTURE 2-0 VICRYL PLUS SH - 27 INCH (36/BX)

## (undated) DEVICE — SUTURE 3-0 VICRYL PLUS SH - 8X 18 INCH (12/BX)

## (undated) DEVICE — GLOVE BIOGEL SZ 8 SURGICAL PF LTX - (50PR/BX 4BX/CA)

## (undated) DEVICE — SYRINGE 10 ML CONTROL LL (25EA/BX 4BX/CA)

## (undated) DEVICE — BLADE SURGICAL CLIPPER - (50EA/CA)

## (undated) DEVICE — GLOVE BIOGEL INDICATOR SZ 7SURGICAL PF LTX - (50/BX 4BX/CA)

## (undated) DEVICE — ELECTRODE 850 FOAM ADHESIVE - HYDROGEL RADIOTRNSPRNT (50/PK)

## (undated) DEVICE — SUCTION INSTRUMENT YANKAUER BULBOUS TIP W/O VENT (50EA/CA)

## (undated) DEVICE — CANISTER SUCTION RIGID RED 1500CC (40EA/CA)

## (undated) DEVICE — BAG SPONGE COUNT 10.25 X 32 - BLUE (250/CA)

## (undated) DEVICE — SUTURE GENERAL

## (undated) DEVICE — BOVIE NEEDLE TIP 3CM COLORADO

## (undated) DEVICE — DRAPE LAPAROTOMY T SHEET - (12EA/CA)

## (undated) DEVICE — GAUZE FLUFF STERILE 2-PLY 36 X 36 (100EA/CA)

## (undated) DEVICE — TOWELS CLOTH SURGICAL - (4/PK 20PK/CA)

## (undated) DEVICE — GLOVE BIOGEL PI INDICATOR SZ 6.5 SURGICAL PF LF - (50/BX 4BX/CA)

## (undated) DEVICE — LACTATED RINGERS INJ 1000 ML - (14EA/CA 60CA/PF)

## (undated) DEVICE — TUBING CLEARLINK DUO-VENT - C-FLO (48EA/CA)

## (undated) DEVICE — GLOVE BIOGEL SZ 6.5 SURGICAL PF LTX (50PR/BX 4BX/CA)

## (undated) DEVICE — SENSOR OXIMETER ADULT SPO2 RD SET (20EA/BX)

## (undated) DEVICE — SPONGE XRAY 8X4 STERL. 12PL - (10EA/TY 80TY/CA)

## (undated) DEVICE — TUBE CONNECTING SUCTION - CLEAR PLASTIC STERILE 72 IN (50EA/CA)

## (undated) DEVICE — TRAY SRGPRP PVP IOD WT PRP - (20/CA)

## (undated) DEVICE — GLOVE BIOGEL INDICATOR SZ 6.5 SURGICAL PF LTX - (50PR/BX 4BX/CA)

## (undated) DEVICE — HUMID-VENT HEAT AND MOISTURE EXCHANGE- (50/BX)

## (undated) DEVICE — PACK MINOR BASIN - (4EA/CA)

## (undated) DEVICE — SUTURE 0 PDS CT-1 CR 8 X 18 (12PK/BX)"

## (undated) DEVICE — MASK ANESTHESIA ADULT  - (100/CA)

## (undated) DEVICE — NEEDLE NON SAFETY HYPO 22 GA X 1 1/2 IN (100/BX)

## (undated) DEVICE — PACK MINOR BASIN - (2EA/CA)

## (undated) DEVICE — GOWN SURGEONS X-LARGE - DISP. (30/CA)

## (undated) DEVICE — SET EXTENSION WITH 2 PORTS (48EA/CA) ***PART #2C8610 IS A SUBSTITUTE*****